# Patient Record
Sex: MALE | Race: WHITE | Employment: OTHER | URBAN - METROPOLITAN AREA
[De-identification: names, ages, dates, MRNs, and addresses within clinical notes are randomized per-mention and may not be internally consistent; named-entity substitution may affect disease eponyms.]

---

## 2017-09-12 PROBLEM — H26.9 LEFT CATARACT: Status: ACTIVE | Noted: 2017-09-12

## 2021-01-21 ENCOUNTER — HOSPITAL ENCOUNTER (OUTPATIENT)
Age: 81
Discharge: HOME | End: 2021-01-23
Payer: MEDICARE

## 2021-01-21 ENCOUNTER — HOSPITAL ENCOUNTER (OUTPATIENT)
Dept: GENERAL RADIOLOGY | Age: 81
Discharge: HOME | End: 2021-01-23
Payer: MEDICARE

## 2021-01-21 DIAGNOSIS — R06.02 SHORTNESS OF BREATH: ICD-10-CM

## 2021-01-21 PROCEDURE — 71046 X-RAY EXAM CHEST 2 VIEWS: CPT

## 2021-05-24 ENCOUNTER — APPOINTMENT (OUTPATIENT)
Dept: GENERAL RADIOLOGY | Age: 81
DRG: 481 | End: 2021-05-24
Payer: MEDICARE

## 2021-05-24 ENCOUNTER — ANESTHESIA EVENT (OUTPATIENT)
Dept: OPERATING ROOM | Age: 81
DRG: 481 | End: 2021-05-24
Payer: MEDICARE

## 2021-05-24 ENCOUNTER — HOSPITAL ENCOUNTER (INPATIENT)
Age: 81
LOS: 4 days | Discharge: NURSING, REHAB OR LONG-TERM CARE | DRG: 481 | End: 2021-05-28
Attending: EMERGENCY MEDICINE | Admitting: INTERNAL MEDICINE
Payer: MEDICARE

## 2021-05-24 DIAGNOSIS — G89.18 POST-OP PAIN: ICD-10-CM

## 2021-05-24 DIAGNOSIS — S72.001A CLOSED FRACTURE OF RIGHT HIP, INITIAL ENCOUNTER (HCC): Primary | ICD-10-CM
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
## 2021-05-24 DIAGNOSIS — I48.92 ATRIAL FLUTTER, UNSPECIFIED TYPE (HCC): ICD-10-CM

## 2021-05-24 DIAGNOSIS — R53.1 GENERAL WEAKNESS: ICD-10-CM

## 2021-05-24 DIAGNOSIS — I50.43 ACUTE ON CHRONIC COMBINED SYSTOLIC AND DIASTOLIC CHF (CONGESTIVE HEART FAILURE) (HCC): ICD-10-CM

## 2021-05-24 PROBLEM — S72.021A: Status: ACTIVE | Noted: 2021-05-24

## 2021-05-24 LAB
ANION GAP SERPL CALCULATED.3IONS-SCNC: 8 MMOL/L (ref 7–16)
ANISOCYTOSIS: ABNORMAL
BACTERIA: ABNORMAL /HPF
BASOPHILS ABSOLUTE: 0.07 E9/L (ref 0–0.2)
BASOPHILS RELATIVE PERCENT: 1.7 % (ref 0–2)
BILIRUBIN URINE: NEGATIVE
BLOOD, URINE: ABNORMAL
BUN BLDV-MCNC: 19 MG/DL (ref 6–23)
BURR CELLS: ABNORMAL
CALCIUM SERPL-MCNC: 8.8 MG/DL (ref 8.6–10.2)
CHLORIDE BLD-SCNC: 106 MMOL/L (ref 98–107)
CLARITY: CLEAR
CO2: 25 MMOL/L (ref 22–29)
COLOR: YELLOW
CREAT SERPL-MCNC: 1.1 MG/DL (ref 0.7–1.2)
EKG ATRIAL RATE: 264 BPM
EKG P AXIS: 34 DEGREES
EKG Q-T INTERVAL: 538 MS
EKG QRS DURATION: 140 MS
EKG QTC CALCULATION (BAZETT): 449 MS
EKG R AXIS: -72 DEGREES
EKG T AXIS: -94 DEGREES
EKG VENTRICULAR RATE: 42 BPM
EOSINOPHILS ABSOLUTE: 0.1 E9/L (ref 0.05–0.5)
EOSINOPHILS RELATIVE PERCENT: 2.6 % (ref 0–6)
GFR AFRICAN AMERICAN: >60
GFR NON-AFRICAN AMERICAN: >60 ML/MIN/1.73
GLUCOSE BLD-MCNC: 142 MG/DL (ref 74–99)
GLUCOSE URINE: NEGATIVE MG/DL
HCT VFR BLD CALC: 36.5 % (ref 37–54)
HEMOGLOBIN: 11.9 G/DL (ref 12.5–16.5)
KETONES, URINE: ABNORMAL MG/DL
LEUKOCYTE ESTERASE, URINE: NEGATIVE
LYMPHOCYTES ABSOLUTE: 0.56 E9/L (ref 1.5–4)
LYMPHOCYTES RELATIVE PERCENT: 13.9 % (ref 20–42)
MCH RBC QN AUTO: 34.3 PG (ref 26–35)
MCHC RBC AUTO-ENTMCNC: 32.6 % (ref 32–34.5)
MCV RBC AUTO: 105.2 FL (ref 80–99.9)
MONOCYTES ABSOLUTE: 0.2 E9/L (ref 0.1–0.95)
MONOCYTES RELATIVE PERCENT: 5.2 % (ref 2–12)
NEUTROPHILS ABSOLUTE: 3.08 E9/L (ref 1.8–7.3)
NEUTROPHILS RELATIVE PERCENT: 76.5 % (ref 43–80)
NITRITE, URINE: NEGATIVE
OVALOCYTES: ABNORMAL
PDW BLD-RTO: 13.6 FL (ref 11.5–15)
PH UA: 6 (ref 5–9)
PLATELET # BLD: 84 E9/L (ref 130–450)
PLATELET CONFIRMATION: NORMAL
PMV BLD AUTO: 12.2 FL (ref 7–12)
POIKILOCYTES: ABNORMAL
POTASSIUM REFLEX MAGNESIUM: 4.4 MMOL/L (ref 3.5–5)
PRO-BNP: 2525 PG/ML (ref 0–450)
PROTEIN UA: NEGATIVE MG/DL
RBC # BLD: 3.47 E12/L (ref 3.8–5.8)
RBC UA: ABNORMAL /HPF (ref 0–2)
SARS-COV-2, NAAT: NOT DETECTED
SODIUM BLD-SCNC: 139 MMOL/L (ref 132–146)
SPECIFIC GRAVITY UA: 1.01 (ref 1–1.03)
T4 FREE: 1.65 NG/DL (ref 0.93–1.7)
TROPONIN: 0.03 NG/ML (ref 0–0.03)
TSH SERPL DL<=0.05 MIU/L-ACNC: 2.72 UIU/ML (ref 0.27–4.2)
UROBILINOGEN, URINE: 1 E.U./DL
WBC # BLD: 4 E9/L (ref 4.5–11.5)
WBC UA: ABNORMAL /HPF (ref 0–5)

## 2021-05-24 PROCEDURE — 71045 X-RAY EXAM CHEST 1 VIEW: CPT

## 2021-05-24 PROCEDURE — 85025 COMPLETE CBC W/AUTO DIFF WBC: CPT

## 2021-05-24 PROCEDURE — 81001 URINALYSIS AUTO W/SCOPE: CPT

## 2021-05-24 PROCEDURE — 96374 THER/PROPH/DIAG INJ IV PUSH: CPT

## 2021-05-24 PROCEDURE — 73552 X-RAY EXAM OF FEMUR 2/>: CPT

## 2021-05-24 PROCEDURE — 96375 TX/PRO/DX INJ NEW DRUG ADDON: CPT

## 2021-05-24 PROCEDURE — 84443 ASSAY THYROID STIM HORMONE: CPT

## 2021-05-24 PROCEDURE — 87635 SARS-COV-2 COVID-19 AMP PRB: CPT

## 2021-05-24 PROCEDURE — 80048 BASIC METABOLIC PNL TOTAL CA: CPT

## 2021-05-24 PROCEDURE — 83880 ASSAY OF NATRIURETIC PEPTIDE: CPT

## 2021-05-24 PROCEDURE — 93005 ELECTROCARDIOGRAM TRACING: CPT | Performed by: STUDENT IN AN ORGANIZED HEALTH CARE EDUCATION/TRAINING PROGRAM

## 2021-05-24 PROCEDURE — 2060000000 HC ICU INTERMEDIATE R&B

## 2021-05-24 PROCEDURE — 99284 EMERGENCY DEPT VISIT MOD MDM: CPT

## 2021-05-24 PROCEDURE — 99222 1ST HOSP IP/OBS MODERATE 55: CPT | Performed by: ORTHOPAEDIC SURGERY

## 2021-05-24 PROCEDURE — 6360000002 HC RX W HCPCS: Performed by: STUDENT IN AN ORGANIZED HEALTH CARE EDUCATION/TRAINING PROGRAM

## 2021-05-24 PROCEDURE — 84484 ASSAY OF TROPONIN QUANT: CPT

## 2021-05-24 PROCEDURE — 99223 1ST HOSP IP/OBS HIGH 75: CPT | Performed by: INTERNAL MEDICINE

## 2021-05-24 PROCEDURE — 2580000003 HC RX 258: Performed by: INTERNAL MEDICINE

## 2021-05-24 PROCEDURE — 73521 X-RAY EXAM HIPS BI 2 VIEWS: CPT

## 2021-05-24 PROCEDURE — 84439 ASSAY OF FREE THYROXINE: CPT

## 2021-05-24 PROCEDURE — 6370000000 HC RX 637 (ALT 250 FOR IP): Performed by: INTERNAL MEDICINE

## 2021-05-24 PROCEDURE — 93010 ELECTROCARDIOGRAM REPORT: CPT | Performed by: INTERNAL MEDICINE

## 2021-05-24 RX ORDER — ACETAMINOPHEN 650 MG/1
650 SUPPOSITORY RECTAL EVERY 6 HOURS PRN
Status: DISCONTINUED | OUTPATIENT
Start: 2021-05-24 | End: 2021-05-28 | Stop reason: HOSPADM

## 2021-05-24 RX ORDER — FUROSEMIDE 20 MG/1
20 TABLET ORAL DAILY
Status: DISCONTINUED | OUTPATIENT
Start: 2021-05-25 | End: 2021-05-28 | Stop reason: HOSPADM

## 2021-05-24 RX ORDER — MORPHINE SULFATE 4 MG/ML
4 INJECTION, SOLUTION INTRAMUSCULAR; INTRAVENOUS ONCE
Status: COMPLETED | OUTPATIENT
Start: 2021-05-24 | End: 2021-05-24

## 2021-05-24 RX ORDER — FOLIC ACID 1 MG/1
1 TABLET ORAL
Status: DISCONTINUED | OUTPATIENT
Start: 2021-05-24 | End: 2021-05-28 | Stop reason: HOSPADM

## 2021-05-24 RX ORDER — OXYCODONE HYDROCHLORIDE AND ACETAMINOPHEN 5; 325 MG/1; MG/1
1 TABLET ORAL EVERY 4 HOURS PRN
Status: DISCONTINUED | OUTPATIENT
Start: 2021-05-24 | End: 2021-05-28 | Stop reason: HOSPADM

## 2021-05-24 RX ORDER — FERROUS SULFATE 325(65) MG
325 TABLET ORAL
COMMUNITY

## 2021-05-24 RX ORDER — OXYCODONE HYDROCHLORIDE AND ACETAMINOPHEN 5; 325 MG/1; MG/1
2 TABLET ORAL EVERY 4 HOURS PRN
Status: DISCONTINUED | OUTPATIENT
Start: 2021-05-24 | End: 2021-05-28 | Stop reason: HOSPADM

## 2021-05-24 RX ORDER — POLYETHYLENE GLYCOL 3350 17 G/17G
17 POWDER, FOR SOLUTION ORAL DAILY PRN
Status: DISCONTINUED | OUTPATIENT
Start: 2021-05-24 | End: 2021-05-28 | Stop reason: HOSPADM

## 2021-05-24 RX ORDER — SODIUM CHLORIDE 0.9 % (FLUSH) 0.9 %
10 SYRINGE (ML) INJECTION PRN
Status: DISCONTINUED | OUTPATIENT
Start: 2021-05-24 | End: 2021-05-28 | Stop reason: HOSPADM

## 2021-05-24 RX ORDER — ASPIRIN 81 MG/1
81 TABLET ORAL DAILY
Status: DISCONTINUED | OUTPATIENT
Start: 2021-05-26 | End: 2021-05-27

## 2021-05-24 RX ORDER — PROMETHAZINE HYDROCHLORIDE 25 MG/1
12.5 TABLET ORAL EVERY 6 HOURS PRN
Status: DISCONTINUED | OUTPATIENT
Start: 2021-05-24 | End: 2021-05-28 | Stop reason: HOSPADM

## 2021-05-24 RX ORDER — SODIUM CHLORIDE 0.9 % (FLUSH) 0.9 %
5-40 SYRINGE (ML) INJECTION EVERY 12 HOURS SCHEDULED
Status: DISCONTINUED | OUTPATIENT
Start: 2021-05-24 | End: 2021-05-28 | Stop reason: HOSPADM

## 2021-05-24 RX ORDER — LISINOPRIL 5 MG/1
2.5 TABLET ORAL DAILY
Status: DISCONTINUED | OUTPATIENT
Start: 2021-05-24 | End: 2021-05-28 | Stop reason: HOSPADM

## 2021-05-24 RX ORDER — FUROSEMIDE 10 MG/ML
40 INJECTION INTRAMUSCULAR; INTRAVENOUS ONCE
Status: COMPLETED | OUTPATIENT
Start: 2021-05-24 | End: 2021-05-24

## 2021-05-24 RX ORDER — ACETAMINOPHEN 325 MG/1
650 TABLET ORAL EVERY 6 HOURS PRN
Status: DISCONTINUED | OUTPATIENT
Start: 2021-05-24 | End: 2021-05-28 | Stop reason: HOSPADM

## 2021-05-24 RX ORDER — MORPHINE SULFATE 2 MG/ML
2 INJECTION, SOLUTION INTRAMUSCULAR; INTRAVENOUS EVERY 4 HOURS PRN
Status: DISCONTINUED | OUTPATIENT
Start: 2021-05-24 | End: 2021-05-28 | Stop reason: HOSPADM

## 2021-05-24 RX ORDER — LIDOCAINE HYDROCHLORIDE ANHYDROUS AND DEXTROSE MONOHYDRATE .4; 5 G/100ML; G/100ML
1 INJECTION, SOLUTION INTRAVENOUS CONTINUOUS
Status: DISCONTINUED | OUTPATIENT
Start: 2021-05-24 | End: 2021-05-24

## 2021-05-24 RX ORDER — SODIUM CHLORIDE 9 MG/ML
25 INJECTION, SOLUTION INTRAVENOUS PRN
Status: DISCONTINUED | OUTPATIENT
Start: 2021-05-24 | End: 2021-05-28 | Stop reason: HOSPADM

## 2021-05-24 RX ORDER — CARVEDILOL 6.25 MG/1
12.5 TABLET ORAL 2 TIMES DAILY WITH MEALS
Status: DISCONTINUED | OUTPATIENT
Start: 2021-05-24 | End: 2021-05-24

## 2021-05-24 RX ORDER — ATORVASTATIN CALCIUM 10 MG/1
10 TABLET, FILM COATED ORAL DAILY
Status: DISCONTINUED | OUTPATIENT
Start: 2021-05-25 | End: 2021-05-28 | Stop reason: HOSPADM

## 2021-05-24 RX ORDER — ONDANSETRON 2 MG/ML
4 INJECTION INTRAMUSCULAR; INTRAVENOUS EVERY 6 HOURS PRN
Status: DISCONTINUED | OUTPATIENT
Start: 2021-05-24 | End: 2021-05-28 | Stop reason: HOSPADM

## 2021-05-24 RX ORDER — POTASSIUM CHLORIDE 20 MEQ/1
20 TABLET, EXTENDED RELEASE ORAL
COMMUNITY

## 2021-05-24 RX ORDER — FERROUS SULFATE 325(65) MG
325 TABLET ORAL
Status: DISCONTINUED | OUTPATIENT
Start: 2021-05-24 | End: 2021-05-28 | Stop reason: HOSPADM

## 2021-05-24 RX ORDER — LEVOTHYROXINE SODIUM 112 UG/1
112 TABLET ORAL NIGHTLY
Status: DISCONTINUED | OUTPATIENT
Start: 2021-05-24 | End: 2021-05-28 | Stop reason: HOSPADM

## 2021-05-24 RX ADMIN — FOLIC ACID 1 MG: 1 TABLET ORAL at 20:30

## 2021-05-24 RX ADMIN — OXYCODONE AND ACETAMINOPHEN 1 TABLET: 5; 325 TABLET ORAL at 20:30

## 2021-05-24 RX ADMIN — LEVOTHYROXINE SODIUM 112 MCG: 0.11 TABLET ORAL at 21:42

## 2021-05-24 RX ADMIN — MORPHINE SULFATE 4 MG: 4 INJECTION, SOLUTION INTRAMUSCULAR; INTRAVENOUS at 12:43

## 2021-05-24 RX ADMIN — Medication 10 ML: at 20:30

## 2021-05-24 RX ADMIN — LISINOPRIL 2.5 MG: 5 TABLET ORAL at 21:44

## 2021-05-24 RX ADMIN — FERROUS SULFATE TAB 325 MG (65 MG ELEMENTAL FE) 325 MG: 325 (65 FE) TAB at 20:30

## 2021-05-24 RX ADMIN — FUROSEMIDE 40 MG: 10 INJECTION, SOLUTION INTRAMUSCULAR; INTRAVENOUS at 12:48

## 2021-05-24 ASSESSMENT — PAIN SCALES - GENERAL
PAINLEVEL_OUTOF10: 0
PAINLEVEL_OUTOF10: 8

## 2021-05-24 NOTE — CARE COORDINATION
Social Work /Discharge Planning:    Pt presents to the ED secondary to a fall at home resulting in right hip pain. SW met with pt who was alert and oriented x3, but had difficulty hearing. Pt's son, Andrade Corona, was also in the room. Pt and wife live in a one floor home. Pt has a walker and wheel chair at home but reports the wheel chair is too big for his home. Pt reports no other DME. Pt has hx at [facility] several years ago but was unable to remember name of facility. Pt's PCP is Dr Kuldip Gamez and he uses [facility] in [place] to fill his prescriptions. KAPIL briefly discussed [facility] placement with pt and son. Pt is in agreement and stated he would have to call his wife to get the name of the facility he was at in the past.      KAPIL/ANDERSON to follow up with pt for BA[facility]ILIO options and/or final discharge plan.

## 2021-05-24 NOTE — H&P
[address] [facility]  History and Physical      CHIEF COMPLAINT:  Fall       Patient of Jessica Garduno MD presents with:  Closed fracture proximal femur, transepiphyseal, right, initial encounter [facility])    History of Present Illness:   Patient is a 79year old male with a past medical history of  DM, thyroid disease, HTN, and CAD. Patient presents to the ED today with complaints of falling at home this am.  Patient states he was using his walker in the kitchen and he became weak and fell to the floor. He states this happened about a week ago, however he was able to get up on his own. Today he was unable to get up therefore he called for his wife to help him. When she couldn't get him up EMS was called. Patient complains of pain in his right hip. Patient denies hitting his head, or any other injuries. Patient denies any chest pain, shortness of breath, fever, chills, abdominal pain, dizziness, fatigue, and headache. Patient's right lower extremity is externally rotated and he complains of pain. X-ray reveals right femur fracture. Orthopedic surgery has been consulted. REVIEW OF SYSTEMS:  Pertinent negatives are above in HPI. 10 point ROS otherwise negative. Past Medical History:   Diagnosis Date    Diabetes mellitus ([address]     History of blood transfusion     2014    Hypertension     Thyroid disease          Past Surgical History:   Procedure Laterality Date    CARDIAC VALVE REPLACEMENT  2014    CATARACT REMOVAL WITH IMPLANT Left 09/12/2017    CHOLECYSTECTOMY      OTHER SURGICAL HISTORY  1997    quadruple bypass        Medications Prior to Admission:    Not in a hospital admission. Note that the patient's home medications were reviewed and the above list is accurate to the best of my knowledge at the time of the exam.    Allergies:    Patient has no known allergies. Social History:    reports that he quit smoking about 24 years ago.  He has never used smokeless tobacco.    Family History:   Unknown at this time      PHYSICAL EXAM:    Vitals:  BP (!) 130/41   Pulse (!) 41   Temp 97.5 °F (36.4 °C) (Temporal)   Resp 16   SpO2 95%       General appearance: NAD, conversant, ill appearing  Eyes: Sclerae anicteric, PERRLA  HEENT: AT/NC, MMM  Neck: FROM, supple, no thyromegaly  Lymph: No cervical / supraclavicular lymphadenopathy  Lungs: Clear to auscultation, WOB normal  CV: RRR, no MRGs, no lower extremity edema  Abdomen: Soft, non-tender; no masses or HSM, +BS  Extremities: FROM without synovitis. No clubbing or cyanosis of the hands. Right leg shortened and externally rotated. Skin: no rash, induration, lesions, or ulcers  Psych: Calm and cooperative. Normal judgement and insight. Normal mood and affect. Neuro: Alert and interactive, face symmetric, speech fluent. 5/5 strength upper extremities, and 5/5 left lower extremity. Patient has ability to move toes in right lower extremity. LABS:  All labs reviewed. Of note:  CBC:   Lab Results   Component Value Date    WBC 4.0 05/24/2021    RBC 3.47 05/24/2021    HGB 11.9 05/24/2021    HCT 36.5 05/24/2021    .2 05/24/2021    MCH 34.3 05/24/2021    MCHC 32.6 05/24/2021    RDW 13.6 05/24/2021    PLT 84 05/24/2021    MPV 12.2 05/24/2021     CMP:    Lab Results   Component Value Date     05/24/2021    K 4.4 05/24/2021     05/24/2021    CO2 25 05/24/2021    BUN 19 05/24/2021    CREATININE 1.1 05/24/2021    GFRAA >60 05/24/2021    LABGLOM >60 05/24/2021    GLUCOSE 142 05/24/2021    CALCIUM 8.8 05/24/2021       Imaging:  CXR:  WNL    X-ray Bilateral Hip:  Mildly displaced right femoral fracture. XR right femur:  Comminuted intratrochanteric fracture of the right hip.       EKG:  I've personally reviewed the patient's EKG:  Atrial flutter with variable AV block  Right bundle branch block  Left anterior fascicular block    Telemetry:  I've personally reviewed the patient's

## 2021-05-24 NOTE — ED NOTES
Bed: 14B-14  Expected date: 5/24/21  Expected time:   Means of arrival: [facility]  [facility]  05/24/21 2538

## 2021-05-24 NOTE — ED PROVIDER NOTES
ATTENDING PROVIDER ATTESTATION:     Adalberto Slaughter presented to the emergency department for evaluation of Fall (right leg possible rotation)   and was initially evaluated by the Medical Resident. See Original ED Note for H&P and ED course above. I have reviewed and discussed the case, including pertinent history (medical, surgical, family and social) and exam findings with the Medical Resident assigned to Adalberto Slaughter. I have personally performed and/or participated in the history, exam, medical decision making, and procedures and agree with all pertinent clinical information and any additional changes or corrections are noted below in my assessment and plan. I have discussed this patient in detail with the resident, and provided the instruction and education,       I have reviewed my findings and recommendations with the assigned Medical Resident, Adalberto Slaughter and members of family present at the time of disposition. I have performed a history and physical examination of this patient and directly supervised the resident caring for this patient      History of Present Illness:    Presents to the ED for fall, beginning prior to arrival.  The complaint has been constant, severe in severity, and worsened by nothing. Fall, says his legs got wobbly and he fell. Has right leg deformity on arrival. Denies chest pain or shortness of breath. No syncope. Review of Systems:   A complete review of systems was performed and pertinent positives and negatives are stated within HPI, all other systems reviewed and are negative.    --------------------------------------------- PAST HISTORY ---------------------------------------------  Past Medical History:  has a past medical history of Diabetes mellitus ([facility] [address] History of blood transfusion, Hypertension, and Thyroid disease. Past Surgical History:  has a past surgical history that includes Cholecystectomy; other surgical history (1997);  Cardiac valve replacement (2014); and Cataract removal with implant (Left, 09/12/2017). Social History:  reports that he quit smoking about 24 years ago. He has never used smokeless tobacco.    Family History: family history is not on file. Unless otherwise noted, family history is non contributory    The patients home medications have been reviewed. Allergies: Patient has no known allergies. Physical Exam:  Constitutional/General: Alert and oriented x3  Head: Normocephalic and atraumatic  Eyes: PERRL, EOMI, sclera non icteric  ENT: Oropharynx clear, handling secretions  Neck: Supple, full ROM, no stridor, no meningeal signs  Respiratory: Lungs clear to auscultation bilaterally, no wheezes, rales, or rhonchi. Not in respiratory distress  Cardiovascular:  Irregular, bradycardic, 2+ distal pulses. Equal extremity pulses. GI:  Abdomen Soft, Non tender, Non distended. No rebound, guarding, or rigidity. No pulsatile masses. Musculoskeletal: Moves all extremities x 4 unable to lift the right leg secondary to pain. The right leg is shortened and externally rotated. Wiggles toes. Palpable pulses. Neurologically intact. Warm and well perfused,  no clubbing, no cyanosis, no edema. Palpable peripheral pulses  Integument: skin warm and dry. No rashes. Neurologic: GCS 15, no focal deficits  Psychiatric: Normal Affect      I directly supervised any procedures performed by the resident and was present for the procedure including all critical portions of the procedure      The cardiac monitor revealed atrial flutter,with a heart rate in the 40s as interpreted by me. The cardiac monitor was ordered secondary to the patient's fall and to monitor the patient for dysrhythmia. CPT [id number]      I, Dr. Eevlia Drummond, am the primary provider of record    My Medical Decision Making:       Fall  Hip fracture, closed neurovascularly intact  Also with flutter, juan jose, ?from coreg  Ortho consult  Medicine consulted for admission        1. Closed fracture of right hip, initial encounter ([facility] [address]    2. Fall, initial encounter    3. General weakness    4. Acute on chronic combined systolic and diastolic CHF (congestive heart failure) ([facility] [address]    5.  Atrial flutter, unspecified type [facility])           Meghan Yancey MD  05/24/21 1269

## 2021-05-24 NOTE — CONSULTS
Department of Orthopedic Surgery  Resident Consult Note  Reason for Consult:  R hip pain    HISTORY OF PRESENT ILLNESS:       Patient is a 79 y.o. male with hip pain after mechanical fall from standing height earlier today. Patient denies head trauma or LOC. He reports he was try to get off the couch when he lost his footing and fell landing on his right hip. He does report a history of recent falls but was able to ambulate after each fall until the fall today. Patient is a community ambulator periodically uses a cane and wheeled walker for ambulation. Denies numbness/tingling/paresthesias. Denies any other orthopedic complaints at this time. Past Medical History:        Diagnosis Date    Diabetes mellitus ([facility] [address]     History of blood transfusion     2014    Hypertension     Thyroid disease      Past Surgical History:        Procedure Laterality Date    CARDIAC VALVE REPLACEMENT  2014    CATARACT REMOVAL WITH IMPLANT Left 09/12/2017    CHOLECYSTECTOMY      OTHER SURGICAL HISTORY  1997    quadruple bypass      Current Medications:   No current facility-administered medications for this encounter. Allergies:  Patient has no known allergies. Social History:   TOBACCO:   reports that he quit smoking about 24 years ago. He has never used smokeless tobacco.  ETOH:   has no history on file for alcohol use. DRUGS:   has no history on file for drug use. ACTIVITIES OF DAILY LIVING:    OCCUPATION:    Family History:   History reviewed. No pertinent family history.     REVIEW OF SYSTEMS:  CONSTITUTIONAL:  negative for fevers, chills  EYES:  negative for acute changes  HEENT:  negative for acute changes  RESPIRATORY:  negative for dyspnea  CARDIOVASCULAR:  negative for chest pain, palpitations  GASTROINTESTINAL:  negative for nausea, vomiting  GENITOURINARY:  negative for frequency  HEMATOLOGIC/LYMPHATIC:  negative for bleeding and petechiae  MUSCULOSKELETAL:  positive for right hip pain  NEUROLOGICAL: negative for head trauma or LOC  BEHAVIOR/PSYCH:  negative for increased agitation and anxiety    PHYSICAL EXAM:    VITALS:  BP (!) 130/41   Pulse (!) 41   Temp 97.5 °F (36.4 °C) (Temporal)   Resp 16   SpO2 95%   CONSTITUTIONAL:  awake, alert, cooperative, no apparent distress, and appears stated age  MUSCULOSKELETAL:  Right lower Extremity:  Extremity fixed in external rotation as this is a position of comfort  Compartments soft and compressible  +PF/DF/EHL  +2/4 DP & PT pulses, Brisk Cap refill, Toes warm and perfused  Distal sensation grossly intact to Peroneals, Sural, Saphenous, and tibial nrs    Secondary Exam:   · Bilateral UE: No obvious signs of trauma. -TTP to fingers, hand, wrist, forearm, elbow, humerus, shoulder or clavicle. -- Patient able to flex/extend fingers, wrist, elbow and shoulder with active and passive ROM without pain, +2/4 Radial pulse, cap refill <3sec, +AIN/PIN/Radial/Ulnar/Median N, distal sensation grossly intact to C4-T1 dermatomes, compartments soft and compressible. · Left LE: No obvious signs of trauma. -TTP to foot, ankle, leg, knee, thigh, hip.-- Patient able to flex/extend toes, ankle, knee and hip with active and passive ROM without pain,+2/4 DP & PT pulses, cap refill <3sec, +5/5 PF/DF/EHL, distal sensation grossly intact to L4-S1 dermatomes, compartments soft and compressible. · Pelvis: -TTP, -Log roll, -Heel strike     DATA:    CBC:   Lab Results   Component Value Date    WBC 4.0 05/24/2021    RBC 3.47 05/24/2021    HGB 11.9 05/24/2021    HCT 36.5 05/24/2021    .2 05/24/2021    MCH 34.3 05/24/2021    MCHC 32.6 05/24/2021    RDW 13.6 05/24/2021    PLT 84 05/24/2021    MPV 12.2 05/24/2021     PT/INR:  No results found for: PROTIME, INR    Radiology Review:  X-ray bilateral hip/right femur: Comminuted intertrochanteric fracture with complete displacement and varus angulation. No other fractures or dislocations appreciated.     IMPRESSION:  · Right intertrochanteric femur fracture    PLAN:  · We will plan for acute orthopedic surgical intervention, right femur open reduction internal fixation with Dr. Sascha Hawkins 5/25/2021  · Nonweightbearing right lower extremity  · N.p.o. after midnight  · Treatment consent  · Hold anticoagulants   · Medical clearance  · Preoperative labs/imaging per medicine  · Plan was discussed with attending    All questions were sought and answered during encounter    Electronically signed by Ruchi Valdes DO on 5/24/2021 at 1:29 PM       I have seen and evaluated the patient and agree with the above assessment on today's visit. I have performed the key components of the history and physical examination and concur completely with the findings and plans as documented. Agree with ROS, examination, FMH, PMH, PSH, SocHx, and allergies as above. Patient physically seen and examined. Patient status post fall with a right intertrochanteric hip fracture. Some comminution present. Talked in detail about surgical intervention. Talked about a cephalomedullary nail. Explained the risk of potential nail cut out requiring total hip arthroplasty. I explained the surgery in detail. I explained the risks and complications of surgery with the patient including but not limited to death from anesthesia, possible neurovascular damage, possible infection,  Possible wound healing issues, possible perioperative fracture, leg length discrepancy, implant failure, possible need for further surgery, etc.  Patient understood this, asked appropriate questions and decided to go forward with the procedure. Physical Examination:   General appearance: alert, well appearing, and in no distress,  normal appearing weight.  No visible signs of trauma   Mental status: alert, oriented to person, place, and time, normal mood, behavior, speech, dress, motor activity, and thought processes  Abdomen: soft, nondistended  Resp:   resp easy and unlabored, no

## 2021-05-24 NOTE — CONSULTS
tobacco: Never Used   Substance Use Topics    Alcohol use: Not on file       No current facility-administered medications for this encounter. Current Outpatient Medications   Medication Sig Dispense Refill    potassium chloride (KLOR-CON M) 20 MEQ extended release tablet Take 20 mEq by mouth Daily with lunch      ferrous sulfate (IRON 325) 325 (65 Fe) MG tablet Take 325 mg by mouth Daily with lunch      levothyroxine (SYNTHROID) 112 MCG tablet Take 112 mcg by mouth nightly       aspirin 81 MG tablet Take 81 mg by mouth daily      glipiZIDE (GLUCOTROL) 10 MG tablet Take 10 mg by mouth 2 times daily (before meals)      furosemide (LASIX) 20 MG tablet Take 20 mg by mouth daily       simvastatin (ZOCOR) 40 MG tablet Take 40 mg by mouth daily       quinapril (ACCUPRIL) 5 MG tablet Take 5 mg by mouth nightly      folic acid (FOLVITE) 1 MG tablet Take 1 mg by mouth Daily with lunch           No Known Allergies    ROS:   Constitutional: Negative for fever, + for activity change and appetite change. HENT: Negative for epistaxis. Eyes: Negative for diploplia, blurred vision. Respiratory: Negative for cough, chest tightness,  and wheezing. + for SOB  Cardiovascular: pertinent positives in HPI  Gastrointestinal: Negative for abdominal pain and blood in stool. All other review of systems are negative     PHYSICAL EXAM:   Vitals:    05/24/21 0950 05/24/21 1515   BP: (!) 130/41 136/63   Pulse: (!) 41 58   Resp: 16 16   Temp: 97.5 °F (36.4 °C)    TempSrc: Temporal    SpO2: 95% 96%      Constitutional: Well-developed, no acute distress, pale seen in the ER with family at bedside. Eyes: conjunctivae normal, no xanthelasma   Ears, Nose, Throat: oral mucosa moist, no cyanosis   CV: no JVD. Shirlie Moder. Normal S1S2 and no S3. No murmurs, rubs, or gallops.  PMI is nondisplaced  Lungs: clear to auscultation bilaterally, normal respiratory effort without used of accessory muscles  Abdomen: soft, non-tender,

## 2021-05-24 NOTE — ED PROVIDER NOTES
Department of Emergency Medicine   ED  Provider Note  Admit Date/RoomTime: 5/24/2021  9:46 AM  ED Room: 4509/4509-B          History of Present Illness:  5/24/21, Time: 9:58 AM EDT  Chief Complaint   Patient presents with    Fall     right leg possible rotation        Andrew Gay is a 79 y.o. male presenting to the ED for fall, beginning just prior to arrival.  The complaint has been persistent, moderate in severity, and worsened by nothing. The patient is an 79-year-old male with a history of diabetes, hypothyroidism, hypertension who presents to the emergency department via EMS from home for a fall. His symptoms were sudden in onset, just prior to arrival, nothing makes it better or worse. He was able to call for help right away, and he states he did not lay on the floor for a long period of time. Patient states that he was feeling weak when he was walking from the family room and his legs gave out and he fell landing on his right side. The patient states that he typically does walk with a walker and he just felt weak all over causing him to fall. He states that he landed on a bag of trash. His right leg is externally rotated and shortened per EMS. The patient denies any loss of consciousness, hitting his head, blood thinner use, chest pain, shortness of breath, palpitations, abdominal pain, nausea, vomiting, diarrhea, hematemesis, hematochezia, melena, back pain, neck pain, other injuries, recent hospitalization, recent illness, or other acute symptoms or concerns. Review of Systems:   Pertinent positives and negatives are stated within HPI, all other systems reviewed and are negative.        --------------------------------------------- PAST HISTORY ---------------------------------------------  Past Medical History:  has a past medical history of Diabetes mellitus ([facility] [address] History of blood transfusion, Hypertension, and Thyroid disease.     Past Surgical History:  has a past surgical history that includes Cholecystectomy; other surgical history (1997); Cardiac valve replacement (2014); and Cataract removal with implant (Left, 09/12/2017). Social History:  reports that he quit smoking about 24 years ago. He has never used smokeless tobacco.    Family History: family history is not on file. . Unless otherwise noted, family history is non contributory    The patients home medications have been reviewed. Allergies: Patient has no known allergies. I have reviewed the past medical history, past surgical history, social history, and family history    ---------------------------------------------------PHYSICAL EXAM--------------------------------------    Constitutional/General: Alert and oriented x3  Head: Normocephalic and atraumatic  Eyes:  EOMI, sclera non icteric  Mouth: Oropharynx clear, handling secretions, no trismus, no asymmetry of the posterior oropharynx or uvular edema  Neck: Supple, full ROM, no stridor, no meningeal signs  Respiratory: Lungs clear to auscultation bilaterally, no wheezes, rales, or rhonchi. Not in respiratory distress  Cardiovascular: Bradycardic rate. Regular rhythm. No murmurs, no aortic murmurs, no gallops, or rubs. 2+ distal pulses. Equal extremity pulses. Chest: No chest wall tenderness  Gastrointestinal:  Abdomen Soft, Non tender, Non distended. No rebound, guarding, or rigidity. No pulsatile masses. Musculoskeletal:  Warm and well perfused, no clubbing, no cyanosis, no edema. Capillary refill <3 seconds. Right leg is externally rotated and shortened. He is able to move his toes. Equal strength in the bilateral upper extremities. Sensation is intact and equal of the bilateral upper and lower extremities. There is mild tenderness palpation over the right hip and upper femur. No step offs or deformities of the cervical spine, non-tender to palpation. Skin: skin warm and dry. No rashes.    Neurologic: GCS 15, no focal deficits, symmetric strength 5/5 in the upper and lower extremities bilaterally  Psychiatric: Normal Affect          -------------------------------------------------- RESULTS -------------------------------------------------  I have personally reviewed all laboratory and imaging results for this patient. Results are listed below.      LABS: (Lab results interpreted by me)  Results for orders placed or performed during the hospital encounter of 05/24/21   COVID-19, Rapid    Specimen: Nasopharyngeal Swab   Result Value Ref Range    SARS-CoV-2, NAAT Not Detected Not Detected   CBC auto differential   Result Value Ref Range    WBC 4.0 (L) 4.5 - 11.5 E9/L    RBC 3.47 (L) 3.80 - 5.80 E12/L    Hemoglobin 11.9 (L) 12.5 - 16.5 g/dL    Hematocrit 36.5 (L) 37.0 - 54.0 %    .2 (H) 80.0 - 99.9 fL    MCH 34.3 26.0 - 35.0 pg    MCHC 32.6 32.0 - 34.5 %    RDW 13.6 11.5 - 15.0 fL    Platelets 84 (L) 778 - 450 E9/L    MPV 12.2 (H) 7.0 - 12.0 fL    Neutrophils % 76.5 43.0 - 80.0 %    Lymphocytes % 13.9 (L) 20.0 - 42.0 %    Monocytes % 5.2 2.0 - 12.0 %    Eosinophils % 2.6 0.0 - 6.0 %    Basophils % 1.7 0.0 - 2.0 %    Neutrophils Absolute 3.08 1.80 - 7.30 E9/L    Lymphocytes Absolute 0.56 (L) 1.50 - 4.00 E9/L    Monocytes Absolute 0.20 0.10 - 0.95 E9/L    Eosinophils Absolute 0.10 0.05 - 0.50 E9/L    Basophils Absolute 0.07 0.00 - 0.20 E9/L    Anisocytosis 1+     Poikilocytes 2+     Rickreall Cells 2+     Ovalocytes 1+    Basic Metabolic Panel w/ Reflex to MG   Result Value Ref Range    Sodium 139 132 - 146 mmol/L    Potassium reflex Magnesium 4.4 3.5 - 5.0 mmol/L    Chloride 106 98 - 107 mmol/L    CO2 25 22 - 29 mmol/L    Anion Gap 8 7 - 16 mmol/L    Glucose 142 (H) 74 - 99 mg/dL    BUN 19 6 - 23 mg/dL    CREATININE 1.1 0.7 - 1.2 mg/dL    GFR Non-African American >60 >=60 mL/min/1.73    GFR African American >60     Calcium 8.8 8.6 - 10.2 mg/dL   Troponin   Result Value Ref Range    Troponin 0.03 0.00 - 0.03 ng/mL   Brain Natriuretic Peptide   Result Value Ref Range Pro-BNP 2,525 (H) 0 - 450 pg/mL   Urinalysis with Microscopic   Result Value Ref Range    Color, UA Yellow Straw/Yellow    Clarity, UA Clear Clear    Glucose, Ur Negative Negative mg/dL    Bilirubin Urine Negative Negative    Ketones, Urine TRACE (A) Negative mg/dL    Specific Gravity, UA 1.015 1.005 - 1.030    Blood, Urine TRACE-INTACT Negative    pH, UA 6.0 5.0 - 9.0    Protein, UA Negative Negative mg/dL    Urobilinogen, Urine 1.0 <2.0 E.U./dL    Nitrite, Urine Negative Negative    Leukocyte Esterase, Urine Negative Negative    WBC, UA NONE 0 - 5 /HPF    RBC, UA 1-3 0 - 2 /HPF    Bacteria, UA NONE SEEN None Seen /HPF   TSH without Reflex   Result Value Ref Range    TSH 2.720 0.270 - 4.200 uIU/mL   T4, FREE   Result Value Ref Range    T4 Free 1.65 0.93 - 1.70 ng/dL   Platelet Confirmation   Result Value Ref Range    Platelet Confirmation CONFIRMED    EKG 12 Lead   Result Value Ref Range    Ventricular Rate 42 BPM    Atrial Rate 264 BPM    QRS Duration 140 ms    Q-T Interval 538 ms    QTc Calculation (Bazett) 449 ms    P Axis 34 degrees    R Axis -72 degrees    T Axis -94 degrees   ,       RADIOLOGY:  Interpreted by Radiologist unless otherwise specified  XR FEMUR RIGHT (MIN 2 VIEWS)   Final Result   1. Comminuted intratrochanteric fracture of the right hip   2. There is no fracture of the distal femur. XR HIP BILATERAL W AP PELVIS (2 VIEWS)   Final Result   Mildly displaced right femoral fracture. XR CHEST PORTABLE   Final Result   1. Cardiomegaly with findings of mild CHF. EKG Interpretation  Interpreted by Rebecca Muhammad DO    EKG: This EKG is signed and interpreted by me.     Rate: 42  Rhythm: Atrial flutter  Interpretation: Atrial flutter with variable AV block, left axis deviation, right bundle branch block, left anterior fascicular block, nonspecific ST changes in the anterior leads, no acute elevations, QTC is 449  Comparison: no previous EKG available ------------------------- NURSING NOTES AND VITALS REVIEWED ---------------------------   The nursing notes within the ED encounter and vital signs as below have been reviewed by myself  /62   Pulse 56   Temp 97.5 °F (36.4 °C) (Temporal) Comment: admitted from 5419  Resp 20   Ht 5' 9\" (1.753 m)   Wt 179 lb (81.2 kg)   SpO2 99%   BMI 26.43 kg/m²     Oxygen Saturation Interpretation: 95 % on room air. Normal    The patients available past medical records and past encounters were reviewed.         ------------------------------ ED COURSE/MEDICAL DECISION MAKING----------------------  Medications   aspirin EC tablet 81 mg (has no administration in time range)   ferrous sulfate (IRON 325) tablet 325 mg (has no administration in time range)   folic acid (FOLVITE) tablet 1 mg (has no administration in time range)   furosemide (LASIX) tablet 20 mg (has no administration in time range)   levothyroxine (SYNTHROID) tablet 112 mcg (has no administration in time range)   lisinopril (PRINIVIL;ZESTRIL) tablet 2.5 mg (has no administration in time range)   atorvastatin (LIPITOR) tablet 10 mg (has no administration in time range)   sodium chloride flush 0.9 % injection 5-40 mL (has no administration in time range)   sodium chloride flush 0.9 % injection 10 mL (has no administration in time range)   0.9 % sodium chloride infusion (has no administration in time range)   enoxaparin (LOVENOX) injection 40 mg (has no administration in time range)   promethazine (PHENERGAN) tablet 12.5 mg (has no administration in time range)     Or   ondansetron (ZOFRAN) injection 4 mg (has no administration in time range)   polyethylene glycol (GLYCOLAX) packet 17 g (has no administration in time range)   acetaminophen (TYLENOL) tablet 650 mg (has no administration in time range)     Or   acetaminophen (TYLENOL) suppository 650 mg (has no administration in time range)   oxyCODONE-acetaminophen (PERCOCET) 5-325 MG per tablet 1 tablet (has no administration in time range)     Or   oxyCODONE-acetaminophen (PERCOCET) 5-325 MG per tablet 2 tablet (has no administration in time range)   morphine (PF) injection 2 mg (has no administration in time range)   perflutren lipid microspheres (DEFINITY) injection 1.65 mg (has no administration in time range)   morphine sulfate (PF) injection 4 mg (4 mg Intravenous Given 5/24/21 1243)   furosemide (LASIX) injection 40 mg (40 mg Intravenous Given 5/24/21 1248)           The cardiac monitor revealed atrial flutter with a heart rate in the 40s as interpreted by me. The cardiac monitor was ordered secondary to the patient's fall and weakness and to monitor the patient for dysrhythmia. CPT [id number]           Medical Decision Making:     The patient was seen and evaluated by the Attending Emergency Medicine Physician Dr. Cordelia Artis. The patient is an 70-year-old male who presents to the emergency department complaining of weakness and a fall. The patient was bradycardic with a heart rate in the 40s to 50s and found to be in atrial flutter, EP consulted. Patient was found to have a right hip fracture, and orthopedics was consulted. BNP is also elevated chest x-ray did show evidence of vascular congestion. Patient was treated with Lasix and morphine. Consulted with medicine who accepted patient for admission. Discussed results and plan of care with patient and son at bedside verbalized understanding agreement treatment plan admission. Re-Evaluations:  ED Course as of May 24 2026   Mon May 24, 2021   1314 Consulted with Dr. Sherren Batman, hospitalist, who accepted for admission.      [KG]      ED Course User Index  [KG] Geovany Plaza DO         This patient's ED course included: a personal history and physicial examination, re-evaluation prior to disposition, multiple bedside re-evaluations, IV medications, cardiac monitoring, continuous pulse oximetry and complex medical decision making and emergency management    This patient has remained hemodynamically stable during their ED course. Consultations:    Spoke with Derinda Boxer, NP (Medicine). Discussed case. They will admit this patient. Spoke with ortho (Orthopedics). Discussed case. They will provide consultation. Spoke with EP (EP). Discussed case. They will provide consultation. Counseling: The emergency provider has spoken with the patient and discussed todays results, in addition to providing specific details for the plan of care and counseling regarding the diagnosis and prognosis. Questions are answered at this time and they are agreeable with the plan.       --------------------------------- IMPRESSION AND DISPOSITION ---------------------------------    IMPRESSION  1. Closed fracture of right hip, initial encounter ([facility] [address]    2. Fall, initial encounter    3. General weakness    4. Acute on chronic combined systolic and diastolic CHF (congestive heart failure) ([facility] [address]    5. [facility]        DISPOSITION  Disposition: Admit to telemetry  Patient condition is serious        NOTE: This report was transcribed using voice recognition software.  Every effort was made to ensure accuracy; however, inadvertent computerized transcription errors may be present        Cassi Little DO  Resident  05/24/21 2026

## 2021-05-24 NOTE — LETTER
[place] Department Medicaid  CERTIFICATION OF NECESSITY  FOR NON-EMERGENCY TRANSPORTATION   BY GROUND AMBULANCE      Individual Information   1. Name: Dianne Portlilo 2. [place] Medicaid Billing Number:    3. Address: [address]      Transportation Provider Information   4. Provider Name: [facility]   5. [place] Medicaid Provider Number:  National Provider Identifier (NPI):      Certification  7. Criteria:  During transport, this individual requires:  [x] Medical treatment or continuous     supervision by an EMT. [] The administration or regulation of oxygen by another person. [] Supervised protective restraint. 8. Period Beginning Date: 5/27/21   9. Length  [x] Not more than 30 day(s)  [] One Year     Additional Information Relevant to Certification   10. Comments or Explanations, If Necessary or Appropriate   S/p right femur open reduction internal fixation, unable to sit in wheelchair length of trip. Patient exhibits decreased strength, balance, coordination impairing functional ability. Certifying Practitioner Information   11. Name of Practitioner: Dr. Chris Gallagher   12. [place] Medicaid Provider Number, If Applicable: [address] Provider Identifier (NPI):      Signature Information   14. Date of Signature: 5/26/2021   15. Name of Person Signing: Electronically signed by Araseli Villalba RN on 5/26/2021 at 1:26 PM     16. Signature and Professional Designation: Electronically signed by Araseli Villalba RN on 5/26/2021 at 1:26 PM  Discharge Planner     [facility] [address]  Rev. 7/2015  [address] Encounter Date/Time: 5/24/2021 [facility] Account: [de-identified]    MRN: [id number]    Patient: Dianne Portillo    Contact Serial #: [id number]      ENCOUNTER          Patient Class: I Private Enc?   No Unit RM BD: SEY[facility] 4S PICU 4509/4509-B   Hospital Service: Intermediate   Encounter DX: Closed fracture proximal*   ADM Provider: Chris Gallagher MD   Procedure:     ATT Provider: Chris Gallagher MD   REF Provider:        Admission DX: Closed fracture proximal femur, transepiphyseal, right, initial encounter [facility]) and codes:       PATIENT                 Name: Shahida Aponte : 1940 (79 yrs)   Address: [place] [address] Sex: Male   City: [facility] [address]         Marital Status:    Employer: RETIRED         Congregation: [employer]   Primary Care Provider: Miguel Tierney MD         Primary Phone: [phone]   EMERGENCY CONTACT   Contact Name Legal Guardian? Relationship to Patient Home Phone Work Phone   1. Marco Gilmore  2. Samia Moreira    No Spouse  Child [phone]                 GUARANTOR            Guarantor: Shahida Aponte     : 1940   Address: [facility] Sex: Male     [place] [address]     Relation to Patient: Self       Home Phone: [phone]   Guarantor ID: [id number]       Work Phone:     Guarantor Employer: RETIRED         Status: RETIRED      COVERAGE        PRIMARY INSURANCE   Payor: [facility] MEDICARE Plan: CRISTEL HICKS*   Payor Address: [facility] [id number] [employer] [id number]       Group Number: [id number] Type: INDEMNITY   Subscriber Name: Akil No : 1940   Subscriber ID: [id number] [place]. Rel. to Sub: Self   SECONDARY INSURANCE   Payor:   Plan:     Payor Address:  ,           Group Number:   Insurance Type:     Subscriber Name:   Subscriber :     Subscriber ID:   Pat.  Rel. to Sub:

## 2021-05-25 ENCOUNTER — ANESTHESIA (OUTPATIENT)
Dept: OPERATING ROOM | Age: 81
DRG: 481 | End: 2021-05-25
Payer: MEDICARE

## 2021-05-25 PROBLEM — S72.001A CLOSED FRACTURE OF RIGHT HIP (HCC): Status: ACTIVE | Noted: 2021-05-24

## 2021-05-25 LAB
ANION GAP SERPL CALCULATED.3IONS-SCNC: 7 MMOL/L (ref 7–16)
BUN BLDV-MCNC: 19 MG/DL (ref 6–23)
CALCIUM SERPL-MCNC: 8.6 MG/DL (ref 8.6–10.2)
CHLORIDE BLD-SCNC: 100 MMOL/L (ref 98–107)
CO2: 30 MMOL/L (ref 22–29)
CREAT SERPL-MCNC: 1 MG/DL (ref 0.7–1.2)
GFR AFRICAN AMERICAN: >60
GFR NON-AFRICAN AMERICAN: >60 ML/MIN/1.73
GLUCOSE BLD-MCNC: 178 MG/DL (ref 74–99)
HCT VFR BLD CALC: 36.3 % (ref 37–54)
HEMOGLOBIN: 11.9 G/DL (ref 12.5–16.5)
MCH RBC QN AUTO: 34.3 PG (ref 26–35)
MCHC RBC AUTO-ENTMCNC: 32.8 % (ref 32–34.5)
MCV RBC AUTO: 104.6 FL (ref 80–99.9)
PDW BLD-RTO: 13.5 FL (ref 11.5–15)
PLATELET # BLD: 102 E9/L (ref 130–450)
PMV BLD AUTO: 12.6 FL (ref 7–12)
POTASSIUM REFLEX MAGNESIUM: 4 MMOL/L (ref 3.5–5)
RBC # BLD: 3.47 E12/L (ref 3.8–5.8)
SODIUM BLD-SCNC: 137 MMOL/L (ref 132–146)
WBC # BLD: 7.9 E9/L (ref 4.5–11.5)

## 2021-05-25 PROCEDURE — 6370000000 HC RX 637 (ALT 250 FOR IP): Performed by: INTERNAL MEDICINE

## 2021-05-25 PROCEDURE — 99223 1ST HOSP IP/OBS HIGH 75: CPT | Performed by: INTERNAL MEDICINE

## 2021-05-25 PROCEDURE — 86901 BLOOD TYPING SEROLOGIC RH(D): CPT

## 2021-05-25 PROCEDURE — 80048 BASIC METABOLIC PNL TOTAL CA: CPT

## 2021-05-25 PROCEDURE — 85027 COMPLETE CBC AUTOMATED: CPT

## 2021-05-25 PROCEDURE — 86900 BLOOD TYPING SEROLOGIC ABO: CPT

## 2021-05-25 PROCEDURE — APPSS60 APP SPLIT SHARED TIME 46-60 MINUTES: Performed by: PHYSICIAN ASSISTANT

## 2021-05-25 PROCEDURE — 86850 RBC ANTIBODY SCREEN: CPT

## 2021-05-25 PROCEDURE — 99233 SBSQ HOSP IP/OBS HIGH 50: CPT | Performed by: INTERNAL MEDICINE

## 2021-05-25 PROCEDURE — 36415 COLL VENOUS BLD VENIPUNCTURE: CPT

## 2021-05-25 PROCEDURE — 6370000000 HC RX 637 (ALT 250 FOR IP): Performed by: FAMILY MEDICINE

## 2021-05-25 PROCEDURE — 2580000003 HC RX 258: Performed by: INTERNAL MEDICINE

## 2021-05-25 PROCEDURE — 2060000000 HC ICU INTERMEDIATE R&B

## 2021-05-25 RX ORDER — LATANOPROST 50 UG/ML
1 SOLUTION/ DROPS OPHTHALMIC NIGHTLY
COMMUNITY

## 2021-05-25 RX ORDER — LATANOPROST 50 UG/ML
1 SOLUTION/ DROPS OPHTHALMIC NIGHTLY
Status: DISCONTINUED | OUTPATIENT
Start: 2021-05-25 | End: 2021-05-28 | Stop reason: HOSPADM

## 2021-05-25 RX ADMIN — LEVOTHYROXINE SODIUM 112 MCG: 0.11 TABLET ORAL at 20:44

## 2021-05-25 RX ADMIN — FERROUS SULFATE TAB 325 MG (65 MG ELEMENTAL FE) 325 MG: 325 (65 FE) TAB at 15:26

## 2021-05-25 RX ADMIN — OXYCODONE AND ACETAMINOPHEN 1 TABLET: 5; 325 TABLET ORAL at 15:26

## 2021-05-25 RX ADMIN — FOLIC ACID 1 MG: 1 TABLET ORAL at 15:26

## 2021-05-25 RX ADMIN — FUROSEMIDE 20 MG: 20 TABLET ORAL at 09:56

## 2021-05-25 RX ADMIN — Medication 10 ML: at 21:00

## 2021-05-25 RX ADMIN — LATANOPROST 1 DROP: 50 SOLUTION/ DROPS OPHTHALMIC at 20:44

## 2021-05-25 RX ADMIN — POLYETHYLENE GLYCOL 3350 17 G: 17 POWDER, FOR SOLUTION ORAL at 15:26

## 2021-05-25 ASSESSMENT — PAIN SCALES - GENERAL
PAINLEVEL_OUTOF10: 0
PAINLEVEL_OUTOF10: 0

## 2021-05-25 ASSESSMENT — ENCOUNTER SYMPTOMS: RESPIRATORY NEGATIVE: 1

## 2021-05-25 NOTE — PROGRESS NOTES
[facility]  [facility]  Report  PATIENT: Lars Izquierdo RECORD NUMBER: [id number]  DATE OF SERVICE:  5/25/2021  ATTENDING ELECTROPHYSIOLOGIST: Shaw Colorado MD   PRIMARY ELECTROPHYSIOLOGIST: Shaw Colorado MD   REFERRING PHYSICIAN: Ricardo Perez MD and Nu Alvarenga MD  CHIEF COMPLAINT: Fall and hip fracture. HPI (initial consult 5/24/21) : This is a 79 y.o. male who followed with Dr. Rajni Sargent and has not followed up with cardiology since the time of her departure. He reports a history of Coronary artery disease with a CABG X4 in the 1990's and PCI in 2012, Atrial fibrillation on Coreg 12.5 mg BID at home and no [address] [place] [address] Road for unclear reasons, Diabetes, Thyroid disease, Hypertension. He presented with recurrent Fall and was found to have a right intertrochanteric femur fracture. He reports no loss of consciousness, lightheadedness, chest pain, palpitation. He does endure significant fatigue and weakness. Upon presentation to the ER he was found to be in atrial flutter with a slow ventricular response he took his Coreg this morning and reports his last dose with this morning. Initial Laboratory evaluation includes Na 139, K 4.4, BUN 19, Cr 1.1, GFR >60, pro BNP 2,525, Trop 0.03, WBC 4.0, H/H 11.9/36.5, Plt 84,000, TSH 2.720,  Free T4 1.65, COVID-19 negative. Chest X ray cardiomegaly with mild CHF.     05/25/2021 Nathalia Loza is seen in follow-up. Family is at the bedside and report he is scheduled for surgery today. His wife was on speaker phone. He remains in atrial flutter with HRs  40s-60s bpm, no significant pause noted off Coreg following yesterday's am dose. He offers no complaints from an EP perspective. He denies periods of lightheaded and/or syncope at home.          Patient Active Problem List    Diagnosis Date Noted    Closed fracture of right hip ([facility] [address] 05/24/2021    Atrial flutter ([address] 05/24/2021    Bradycardia     Left cataract 2017       Past Medical History:   Diagnosis Date    Diabetes mellitus ([address]     History of blood transfusion     2014    Hypertension     Thyroid disease        History reviewed. No pertinent family history.     Social History     Tobacco Use    Smoking status: Former Smoker     Quit date:      Years since quittin.4    Smokeless tobacco: Never Used   Substance Use Topics    Alcohol use: Not on file       Current Facility-Administered Medications   Medication Dose Route Frequency Provider Last Rate Last Admin    ceFAZolin (ANCEF) 2000 mg in sterile water 20 mL IV syringe  2,000 mg Intravenous Once [address] Lew Blue DO        [START ON 2021] aspirin EC tablet 81 mg  81 mg Oral Daily Fernando Belle MD        ferrous sulfate (IRON 325) tablet 325 mg  325 mg Oral Lunch Fernando Belle MD   325 mg at 9    folic acid (FOLVITE) tablet 1 mg  1 mg Oral Lunch Fernando Belle MD   1 mg at 21    furosemide (LASIX) tablet 20 mg  20 mg Oral Daily Fernando Belle MD   20 mg at 21 0956    levothyroxine (SYNTHROID) tablet 112 mcg  112 mcg Oral Nightly Fernando Belle MD   112 mcg at 21 214    lisinopril (PRINIVIL;ZESTRIL) tablet 2.5 mg  2.5 mg Oral Daily Fernando Belle MD   2.5 mg at 21 214    atorvastatin (LIPITOR) tablet 10 mg  10 mg Oral Daily Fernando Belle MD        sodium chloride flush 0.9 % injection 5-40 mL  5-40 mL Intravenous 2 times per day Fernando Belle MD   10 mL at 21    sodium chloride flush 0.9 % injection 10 mL  10 mL Intravenous PRN Fernando Belle MD        0.9 % sodium chloride infusion  25 mL Intravenous PRN Fernando Belle MD        enoxaparin (LOVENOX) injection 40 mg  40 mg Subcutaneous Daily Fernando Belle MD        promethazine (PHENERGAN) tablet 12.5 mg  12.5 mg Oral Q6H PRN Fernando Belle MD        Or    ondansetron [facility] injection 4 mg  4 mg Intravenous Q6H PRN Mercedez Clements MD        polyethylene glycol [facility]) packet 17 g  17 g Oral Daily PRN Mercedez Clements MD        acetaminophen (TYLENOL) tablet 650 mg  650 mg Oral Q6H PRN Mercedez Clements MD        Or    acetaminophen (TYLENOL) suppository 650 mg  650 mg Rectal Q6H PRN Mercedez Clements MD        oxyCODONE-acetaminophen (PERCOCET) 5-325 MG per tablet 1 tablet  1 tablet Oral Q4H PRN Mercedez Clements MD   1 tablet at 05/24/21 2030    Or    oxyCODONE-acetaminophen (PERCOCET) 5-325 MG per tablet 2 tablet  2 tablet Oral Q4H PRN Mercedez Clements MD        morphine (PF) injection 2 mg  2 mg Intravenous Q4H PRN Mercedez Clements MD        perflutren lipid microspheres (DEFINITY) injection 1.65 mg  1.5 mL Intravenous ONCE PRN Ruben Ontiveros MD            No Known Allergies    Review of Systems   Respiratory: Negative. Cardiovascular: Negative. Musculoskeletal:        Fractured femur     Neurological: Negative. All other systems reviewed and are negative. PHYSICAL EXAM:   Vitals:    05/24/21 2144 05/25/21 0030 05/25/21 0641 05/25/21 0824   BP: 124/62 (!) 123/58 114/60 136/64   Pulse:  81 54 (!) 40   Resp:  20 16 18   Temp:  97.6 °F (36.4 °C) 97.7 °F (36.5 °C) 97.7 °F (36.5 °C)   TempSrc:  Temporal Oral Oral   SpO2:  95% 94% 95%   Weight:       Height:          Constitutional: Well-developed, no acute distress  Eyes: conjunctivae normal   Ears, Nose, Throat: oral mucosa moist, no cyanosis   CV: no JVD. Marylen Forrester. Normal S1S2 and no S3. No murmurs, rubs, or gallops.  PMI is nondisplaced  Lungs: clear to auscultation bilaterally, normal respiratory effort without used of accessory muscles  Abdomen: soft,   Musculoskeletal: fractured femur   Skin: warm, no rashes, pale    I have personally reviewed the laboratory, cardiac diagnostic and radiographic testing as outlined below:    Data:    Recent Labs     05/24/21  1008 05/25/21  0531   WBC 4.0* 7.9   HGB 11.9* 11.9*   HCT 36.5* 36.3*   PLT 84* 102*     Recent Labs     05/24/21  1008 05/25/21  0531    137   K 4.4 4.0    100   CO2 25 30*   BUN 19 19   CREATININE 1.1 1.0   CALCIUM 8.8 8.6      No results found for: MG  Recent Labs     05/24/21  1008   TSH 2.720     No results for input(s): INR in the last 72 hours. CXR 05/24/12: Cardiomegaly with mild CHF      EKG 5/24/21: Atrial flutter with HR of 42 bpm, RBBB, LAFB, QTc 449 msec. Telemetry 5/24/21: Atrial flutter with rates 40-55 bpm, no significant pauses    Telemetry 05/25/2021: atrial flutter with HRs 40s-60s bpm, no significant pause noted    Assessment/Plan:    1. Atrial flutter with slow ventricular response   - Unknown duration  - Asymptomatic   - [id number] 5 (Vasc, HTN, DM, Age), reports no OAC at home unclear if this was due to recurrent falls or bleeding.   - Unclear history of  rhythm control.  - Currently on Coreg 12.5 mg bid for rate control >> held 5/24/2021  - Avoid AV yimi blocking agents    2. Bradycardia   - AF with SVR  - Secondary to AV node blocking agents and underlying AV conduction disease.  - Doubt contributory to his recurrent falls per history but possibly related to his complaint of fatigue.  - HRs 40s-60s bpm off Coreg (5/24/21)    3. Bifascicular block  - RBBB and LAFB. - No definite complete AV block seen thus far. 4. Coronary artery disease   - History of CABG in 1990's and PCI in 2012. - On ASA, Coreg and Zocor.   - Coreg held 5/24/2021    5. Hypertension   - On Accupril, Lasix and Coreg.  - Coreg held 5/24/2021    6. Hypothyroidism   - On Synthroid   - TSH 2.720 (5/24/21)    7. Diabetes milltus   - On Glipizide    Recommendations:    1. Continue to hold Coreg (AV yimi blocking therapy) and monitor heart rates. 2. Can consider outpatient cardiac monitor at discharge. 3. From an EP perspective, OK for surgery.    4. Consider cardiology consult if cardiac clearance is needed pre-operatively. 5. Reported not a candidate for [address] due to frequent falls. 6. No indication for permanent pacemaker at this time. 7. Continue telemetry. Thank you for allowing me to participate in your patient's care. YONNY Matamoros - NP - discussed with Dr Suyapa Dinh  [facility]  10:03 AM  5/25/2021    Attending [de-identified] Statement    Patient seen with the ANP. Agree with the findings, assessment and plan. Management plan was discussed. I have personally interviewed the patient, independently performed a focused cardiac examination, reviewed the pertinent laboratory and diagnostic testing with the patient and directly participated in the medical decision-making as noted above with the following additions:     Remains in AF with HR 40-60 bpm. Denies any dizziness, LHD or syncope. Physical exam showed no JVD, IRIR, no murmur, clear lungs bilaterally, trace edema. Continue to hold off Coreg and AV node blocker agents. Await for echocardiogram result. OK to proceed with  orthopedic surgery per EP perspectives. Consider long term [address] after surgery if no definite contraindications.     Shaw Colorado MD  Cardiac Electrophysiology  [facility]  [facility]  9:06 PM  5/25/2021

## 2021-05-25 NOTE — CARE COORDINATION
Transition of Care-Met with patient and his family at bedside ( wife was on speaker phone) to discuss discharge planning. Patient had a past stay at the [facility] now [facility] and requested a referral, send information to liaison to review. Patient going to OR tomorrow for right femur open reduction internal fixation with Dr. Pool Rossi 5/25/2021. CM following.     Amanda LUCIAN, RN  [facility] [place]   [phone]

## 2021-05-25 NOTE — PROGRESS NOTES
Physical Therapy    PT consult to evaluate/treat received and appreciated. Pt chart reviewed and evaluation attempted. Pt is currently for \"right femur open reduction internal fixation with Dr. Margoth Lund 5/25/2021\". Will check back as able. Thank you.         Elias Bhakta, PT, DPT   [id number]

## 2021-05-25 NOTE — PROGRESS NOTES
05/26/2021     Hr varies bw 40 and 80 - no symptoms at rest   appreciate cards and EP inputs  Pt ok to proceed with surgery from medicine , cleared by cardiology - considered to be at risk   Will continue to hold coreg and probably dc at discharge      DVT Prophylaxis   PT/OT  Discharge Karen Martinez MD  1:07 PM  5/25/2021

## 2021-05-25 NOTE — CONSULTS
Patient seen and examined. Chart, labs, imaging studies, EKG and rhythm strips reviewed. Full consult to follow. We were asked to see the patient for cardiac risk stratification prior to orthopedic surgical intervention. IMPRESSION:  1. Patient is at least at intermediate risk for general anesthesia/surgery in view of his past cardiac history. Any cardiac testing prior to surgery, however will not change the recommendations. 2. CAD with history of CABG in the 1990s and stents around 2012, specifics unknown. Denies chest pain. Has history of CHF, but is not in CHF on exam. Questionable old inferior and anterior MI on EKG. Has not seen a cardiologist in a long time. 3. Atrial flutter with slow ventricular response, date of onset unclear. EP following. Deemed high risk for oral anticoagulation due to recurrent falls (ambulates with walker). 4. 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 5. Right hip fracture s/p fall. 6. History of hypertension. 7. Hyperlipidemia. 8. Type II diabetes mellitus. 9. Ex smoker. 10. Hypothyroidism, on replacement therapy. 11. Thrombocytopenia. 12. Mild anemia. 13. Cardiac murmur on exam.     PLAN:   1. May proceed with surgery without prior cardiac testing, as that will not change recommendations. 2. Avoid hypotension, hypoxia, severe anemia or aggressive fluid resuscitation in the perioperative period. 3. Will order echocardiogram post-op. 4. Will follow.      Electronically signed by Crystal Tamayo MD on 5/25/2021 at 10:00 AM

## 2021-05-25 NOTE — CONSULTS
Inpatient Cardiology Consultation      Reason for Consult: Cardiac risk stratification    Consulting Physician: Dr. Ambar Contreras    Requesting Physician: Dr. Sherren Batman    Date of Consultation: 5/25/2021    HISTORY OF PRESENT ILLNESS:   Patient is an 79year old WM who is not previously known to [address] [place] [address] Cardiology. He used to follow with Dr. Franky Hutchins in the past.  He is being seen in consultation this hospital admission by Dr. Ambar Contreras for cardiac risk stratification prior to orthopedic surgical intervention regarding right hip fracture. Patient has a known past medical history of former tobacco abuse, coronary artery disease status-post CABG x 4 in the 1990s with prior PCI in 2012 (details unknown, reports unavailable for review at this time), hypertension, hyperlipidemia, non-insulin requiring diabetes mellitus type II, hypothyroidism on replacement therapy, chronic heart failure with unknown ejection fraction. The patient himself, his wife and family present at bedside deny the patient having a past medical history of prior cardiac arrhythmia, including atrial fibrillation/flutter. He is not on oral anticoagulation at home. The patient admits to having recurrent mechanical falls as of recent. He presented to [facility] on May 24, 2021 after a mechanical fall at home. He states he has had four to five mechanical falls at home over the past month or two. He admits to generalized weakness and fatigue over the past couple of months. He also notes of chronic dyspnea on exertion, which is at his typical baseline, as well as mild peripheral edema in bilateral lower extremities. On day of hospital presentation, the patient states that he was attempting to walk throughout his home when he felt generally weak and fatigued --> his legs gave out and he fell to the floor. He states he fell onto his right side/right hip. He was able to call for help right away.   He denies any complaints of chest discomfort at rest or on exertion, shortness of breath at rest, nausea, emesis, diaphoresis, dizziness, palpitations, near-syncope or syncope. He states he has never lost consciousness with the falls. He has no symptoms of dizziness, lightheadedness or palpitations prior to the episodes. He denies paroxysmal nocturnal dyspnea, orthopnea. He is asymptomatic from a cardiac perspective during my time of interview/exam today. On admission, patient was found to have a right intertrochanteric femur fracture, and is scheduled for surgical intervention by orthopedic surgery service. He was also noted on admission to be in atrial fibrillation/flutter with slow ventricular response (reportedly asymptomatic). The patient himself, and his family deny any prior history of this rhythm disturbance. Family and social history as noted below. Labs and diagnostic testing as noted below. Please note: past medical records were reviewed per electronic medical record (EMR) - see detailed reports under Past Medical/ Surgical History. PAST MEDICAL HISTORY:    1. Former tobacco smoker. 2. Coronary artery disease status-post CABG x 4 in the 1990s. Prior PCI in 2012 (details unknown). 3. Hypertension. 4. Hyperlipidemia, on statin therapy. 5. Non-insulin requiring diabetes mellitus type II. 6. Frequent mechanical falls. 7. Hypothyroidism, on replacement therapy. 8. ?History of atrial fibrillation/flutter. 9. Chronic heart failure with unknown EF. 10. Medical non-compliance. PAST SURGICAL HISTORY:    Past Surgical History:   Procedure Laterality Date    CARDIAC VALVE REPLACEMENT  2014    CATARACT REMOVAL WITH IMPLANT Left 09/12/2017    CHOLECYSTECTOMY      OTHER SURGICAL HISTORY  1997    quadruple bypass        HOME MEDICATIONS:  Prior to Admission medications    Medication Sig Start Date End Date Taking?  Authorizing Provider   potassium chloride (KLOR-CON M) 20 MEQ extended release tablet Take 20 mEq by mouth Daily with lunch   Yes Historical Provider, MD   ferrous sulfate (IRON 325) 325 (65 Fe) MG tablet Take 325 mg by mouth Daily with lunch   Yes Historical Provider, MD   levothyroxine (SYNTHROID) 112 MCG tablet Take 112 mcg by mouth nightly    Yes Historical Provider, MD   aspirin 81 MG tablet Take 81 mg by mouth daily   Yes Historical Provider, MD   glipiZIDE (GLUCOTROL) 10 MG tablet Take 10 mg by mouth 2 times daily (before meals)   Yes Historical Provider, MD   furosemide (LASIX) 20 MG tablet Take 20 mg by mouth daily    Yes Historical Provider, MD   simvastatin (ZOCOR) 40 MG tablet Take 40 mg by mouth daily    Yes Historical Provider, MD   quinapril (ACCUPRIL) 5 MG tablet Take 5 mg by mouth nightly   Yes Historical Provider, MD   folic acid (FOLVITE) 1 MG tablet Take 1 mg by mouth Daily with lunch    Yes Historical Provider, MD       CURRENT MEDICATIONS:      Current Facility-Administered Medications:     ceFAZolin (ANCEF) 2000 mg in sterile water 20 mL IV syringe, 2,000 mg, Intravenous, Once, Mounika Gonzales DO    [START ON 5/26/2021] aspirin EC tablet 81 mg, 81 mg, Oral, Daily, Cathryn Aviles MD    ferrous sulfate (IRON 325) tablet 325 mg, 325 mg, Oral, Lunch, Cathryn Aviles MD, 325 mg at 58/16/62 4707    folic acid (FOLVITE) tablet 1 mg, 1 mg, Oral, Lunch, Cathryn Aviles MD, 1 mg at 05/24/21 2030    furosemide (LASIX) tablet 20 mg, 20 mg, Oral, Daily, Cathryn Aviles MD    levothyroxine (SYNTHROID) tablet 112 mcg, 112 mcg, Oral, Nightly, Cathryn Aviles MD, 112 mcg at 05/24/21 2142    lisinopril (PRINIVIL;ZESTRIL) tablet 2.5 mg, 2.5 mg, Oral, Daily, Cathryn Aviles MD, 2.5 mg at 05/24/21 2144    atorvastatin (LIPITOR) tablet 10 mg, 10 mg, Oral, Daily, Cathryn Aviles MD    sodium chloride flush 0.9 % injection 5-40 mL, 5-40 mL, Intravenous, 2 times per day, Cathryn Aviles MD, 10 mL at 05/24/21 2030    sodium chloride flush 0.9 % injection 10 mL, 10 mL, Intravenous, PRN, Cathryn Aviles MD  [facility]    PLAN:   1. May proceed with surgery without prior cardiac testing, as that will not change recommendations. 2. Avoid hypotension, hypoxia, severe anemia or aggressive fluid resuscitation in the perioperative period. 3. Will order echocardiogram post-op.   4. Will follow.      Electronically signed by Asim Henderson MD on 5/25/2021 at 10:00 AM

## 2021-05-26 ENCOUNTER — APPOINTMENT (OUTPATIENT)
Dept: GENERAL RADIOLOGY | Age: 81
DRG: 481 | End: 2021-05-26
Payer: MEDICARE

## 2021-05-26 VITALS
DIASTOLIC BLOOD PRESSURE: 50 MMHG | OXYGEN SATURATION: 100 % | SYSTOLIC BLOOD PRESSURE: 99 MMHG | TEMPERATURE: 93.4 F | RESPIRATION RATE: 17 BRPM

## 2021-05-26 DIAGNOSIS — R00.1 BRADYCARDIA: Primary | ICD-10-CM

## 2021-05-26 DIAGNOSIS — I48.92 ATRIAL FLUTTER, UNSPECIFIED TYPE (HCC): ICD-10-CM

## 2021-05-26 LAB
ABO/RH: NORMAL
ANTIBODY SCREEN: NORMAL

## 2021-05-26 PROCEDURE — 76942 ECHO GUIDE FOR BIOPSY: CPT | Performed by: ANESTHESIOLOGY

## 2021-05-26 PROCEDURE — 7100000000 HC PACU RECOVERY - FIRST 15 MIN: Performed by: ORTHOPAEDIC SURGERY

## 2021-05-26 PROCEDURE — 2720000010 HC SURG SUPPLY STERILE: Performed by: ORTHOPAEDIC SURGERY

## 2021-05-26 PROCEDURE — 6360000002 HC RX W HCPCS

## 2021-05-26 PROCEDURE — 2500000003 HC RX 250 WO HCPCS: Performed by: STUDENT IN AN ORGANIZED HEALTH CARE EDUCATION/TRAINING PROGRAM

## 2021-05-26 PROCEDURE — 6370000000 HC RX 637 (ALT 250 FOR IP): Performed by: STUDENT IN AN ORGANIZED HEALTH CARE EDUCATION/TRAINING PROGRAM

## 2021-05-26 PROCEDURE — 3E0T3BZ INTRODUCTION OF ANESTHETIC AGENT INTO PERIPHERAL NERVES AND PLEXI, PERCUTANEOUS APPROACH: ICD-10-PCS | Performed by: ANESTHESIOLOGY

## 2021-05-26 PROCEDURE — 6360000002 HC RX W HCPCS: Performed by: ANESTHESIOLOGY

## 2021-05-26 PROCEDURE — 6360000002 HC RX W HCPCS: Performed by: PHYSICAL THERAPY ASSISTANT

## 2021-05-26 PROCEDURE — 27245 TREAT THIGH FRACTURE: CPT | Performed by: ORTHOPAEDIC SURGERY

## 2021-05-26 PROCEDURE — 99233 SBSQ HOSP IP/OBS HIGH 50: CPT | Performed by: INTERNAL MEDICINE

## 2021-05-26 PROCEDURE — 6370000000 HC RX 637 (ALT 250 FOR IP): Performed by: INTERNAL MEDICINE

## 2021-05-26 PROCEDURE — 6360000002 HC RX W HCPCS: Performed by: STUDENT IN AN ORGANIZED HEALTH CARE EDUCATION/TRAINING PROGRAM

## 2021-05-26 PROCEDURE — C1713 ANCHOR/SCREW BN/BN,TIS/BN: HCPCS | Performed by: ORTHOPAEDIC SURGERY

## 2021-05-26 PROCEDURE — 2580000003 HC RX 258: Performed by: STUDENT IN AN ORGANIZED HEALTH CARE EDUCATION/TRAINING PROGRAM

## 2021-05-26 PROCEDURE — 3209999900 FLUORO FOR SURGICAL PROCEDURES

## 2021-05-26 PROCEDURE — 2709999900 HC NON-CHARGEABLE SUPPLY: Performed by: ORTHOPAEDIC SURGERY

## 2021-05-26 PROCEDURE — 3600000015 HC SURGERY LEVEL 5 ADDTL 15MIN: Performed by: ORTHOPAEDIC SURGERY

## 2021-05-26 PROCEDURE — 7100000001 HC PACU RECOVERY - ADDTL 15 MIN: Performed by: ORTHOPAEDIC SURGERY

## 2021-05-26 PROCEDURE — 2500000003 HC RX 250 WO HCPCS

## 2021-05-26 PROCEDURE — 3700000001 HC ADD 15 MINUTES (ANESTHESIA): Performed by: ORTHOPAEDIC SURGERY

## 2021-05-26 PROCEDURE — C1769 GUIDE WIRE: HCPCS | Performed by: ORTHOPAEDIC SURGERY

## 2021-05-26 PROCEDURE — 3700000000 HC ANESTHESIA ATTENDED CARE: Performed by: ORTHOPAEDIC SURGERY

## 2021-05-26 PROCEDURE — 0QS604Z REPOSITION RIGHT UPPER FEMUR WITH INTERNAL FIXATION DEVICE, OPEN APPROACH: ICD-10-PCS | Performed by: ORTHOPAEDIC SURGERY

## 2021-05-26 PROCEDURE — 2060000000 HC ICU INTERMEDIATE R&B

## 2021-05-26 PROCEDURE — 3600000005 HC SURGERY LEVEL 5 BASE: Performed by: ORTHOPAEDIC SURGERY

## 2021-05-26 PROCEDURE — 2580000003 HC RX 258

## 2021-05-26 DEVICE — SCREW TFNA FEN 100MM: Type: IMPLANTABLE DEVICE | Site: HIP | Status: FUNCTIONAL

## 2021-05-26 DEVICE — SCREW BNE L40MM DIA5MM ST TIB LT GRN TI ST CANN LOK FULL: Type: IMPLANTABLE DEVICE | Site: HIP | Status: FUNCTIONAL

## 2021-05-26 DEVICE — NAIL IM L170MM DIA11MM NK 125DEG SHT PROX FEM GRN TI-15MO: Type: IMPLANTABLE DEVICE | Site: HIP | Status: FUNCTIONAL

## 2021-05-26 RX ORDER — SODIUM CHLORIDE 9 MG/ML
INJECTION, SOLUTION INTRAVENOUS CONTINUOUS PRN
Status: DISCONTINUED | OUTPATIENT
Start: 2021-05-26 | End: 2021-05-26 | Stop reason: SDUPTHER

## 2021-05-26 RX ORDER — OXYCODONE HYDROCHLORIDE AND ACETAMINOPHEN 5; 325 MG/1; MG/1
2 TABLET ORAL PRN
Status: DISCONTINUED | OUTPATIENT
Start: 2021-05-26 | End: 2021-05-26

## 2021-05-26 RX ORDER — ONDANSETRON 2 MG/ML
INJECTION INTRAMUSCULAR; INTRAVENOUS PRN
Status: DISCONTINUED | OUTPATIENT
Start: 2021-05-26 | End: 2021-05-26 | Stop reason: SDUPTHER

## 2021-05-26 RX ORDER — MIDAZOLAM HYDROCHLORIDE 2 MG/2ML
1 INJECTION, SOLUTION INTRAMUSCULAR; INTRAVENOUS PRN
Status: DISCONTINUED | OUTPATIENT
Start: 2021-05-26 | End: 2021-05-26

## 2021-05-26 RX ORDER — MORPHINE SULFATE 2 MG/ML
1 INJECTION, SOLUTION INTRAMUSCULAR; INTRAVENOUS EVERY 5 MIN PRN
Status: DISCONTINUED | OUTPATIENT
Start: 2021-05-26 | End: 2021-05-26

## 2021-05-26 RX ORDER — PHENYLEPHRINE HCL IN 0.9% NACL 1 MG/10 ML
SYRINGE (ML) INTRAVENOUS PRN
Status: DISCONTINUED | OUTPATIENT
Start: 2021-05-26 | End: 2021-05-26 | Stop reason: SDUPTHER

## 2021-05-26 RX ORDER — ACETAMINOPHEN 500 MG
1000 TABLET ORAL ONCE
Status: CANCELLED | OUTPATIENT
Start: 2021-05-26 | End: 2021-05-26

## 2021-05-26 RX ORDER — MORPHINE SULFATE 2 MG/ML
2 INJECTION, SOLUTION INTRAMUSCULAR; INTRAVENOUS EVERY 5 MIN PRN
Status: DISCONTINUED | OUTPATIENT
Start: 2021-05-26 | End: 2021-05-26

## 2021-05-26 RX ORDER — CEFAZOLIN SODIUM 1 G/3ML
INJECTION, POWDER, FOR SOLUTION INTRAMUSCULAR; INTRAVENOUS PRN
Status: DISCONTINUED | OUTPATIENT
Start: 2021-05-26 | End: 2021-05-26 | Stop reason: SDUPTHER

## 2021-05-26 RX ORDER — ONDANSETRON 2 MG/ML
4 INJECTION INTRAMUSCULAR; INTRAVENOUS
Status: DISCONTINUED | OUTPATIENT
Start: 2021-05-26 | End: 2021-05-26

## 2021-05-26 RX ORDER — ROCURONIUM BROMIDE 10 MG/ML
INJECTION, SOLUTION INTRAVENOUS PRN
Status: DISCONTINUED | OUTPATIENT
Start: 2021-05-26 | End: 2021-05-26 | Stop reason: SDUPTHER

## 2021-05-26 RX ORDER — DEXAMETHASONE SODIUM PHOSPHATE 10 MG/ML
INJECTION INTRAMUSCULAR; INTRAVENOUS PRN
Status: DISCONTINUED | OUTPATIENT
Start: 2021-05-26 | End: 2021-05-26 | Stop reason: SDUPTHER

## 2021-05-26 RX ORDER — OXYCODONE HYDROCHLORIDE AND ACETAMINOPHEN 5; 325 MG/1; MG/1
1 TABLET ORAL EVERY 6 HOURS PRN
Qty: 28 TABLET | Refills: 0 | Status: SHIPPED | OUTPATIENT
Start: 2021-05-26 | End: 2021-06-02

## 2021-05-26 RX ORDER — ROPIVACAINE HYDROCHLORIDE 5 MG/ML
30 INJECTION, SOLUTION EPIDURAL; INFILTRATION; PERINEURAL ONCE
Status: COMPLETED | OUTPATIENT
Start: 2021-05-26 | End: 2021-05-26

## 2021-05-26 RX ORDER — MEPERIDINE HYDROCHLORIDE 25 MG/ML
12.5 INJECTION INTRAMUSCULAR; INTRAVENOUS; SUBCUTANEOUS
Status: DISCONTINUED | OUTPATIENT
Start: 2021-05-26 | End: 2021-05-26

## 2021-05-26 RX ORDER — OXYCODONE HYDROCHLORIDE AND ACETAMINOPHEN 5; 325 MG/1; MG/1
1 TABLET ORAL PRN
Status: DISCONTINUED | OUTPATIENT
Start: 2021-05-26 | End: 2021-05-26

## 2021-05-26 RX ORDER — LIDOCAINE HYDROCHLORIDE 20 MG/ML
INJECTION, SOLUTION INTRAVENOUS PRN
Status: DISCONTINUED | OUTPATIENT
Start: 2021-05-26 | End: 2021-05-26 | Stop reason: SDUPTHER

## 2021-05-26 RX ORDER — ROPIVACAINE HYDROCHLORIDE 5 MG/ML
INJECTION, SOLUTION EPIDURAL; INFILTRATION; PERINEURAL
Status: COMPLETED | OUTPATIENT
Start: 2021-05-26 | End: 2021-05-26

## 2021-05-26 RX ORDER — FENTANYL CITRATE 50 UG/ML
INJECTION, SOLUTION INTRAMUSCULAR; INTRAVENOUS PRN
Status: DISCONTINUED | OUTPATIENT
Start: 2021-05-26 | End: 2021-05-26 | Stop reason: SDUPTHER

## 2021-05-26 RX ORDER — PROPOFOL 10 MG/ML
INJECTION, EMULSION INTRAVENOUS PRN
Status: DISCONTINUED | OUTPATIENT
Start: 2021-05-26 | End: 2021-05-26 | Stop reason: SDUPTHER

## 2021-05-26 RX ORDER — EPHEDRINE SULFATE/0.9% NACL/PF 50 MG/5 ML
SYRINGE (ML) INTRAVENOUS PRN
Status: DISCONTINUED | OUTPATIENT
Start: 2021-05-26 | End: 2021-05-26 | Stop reason: SDUPTHER

## 2021-05-26 RX ADMIN — CEFAZOLIN 2000 MG: 10 INJECTION, POWDER, FOR SOLUTION INTRAVENOUS at 20:46

## 2021-05-26 RX ADMIN — DEXAMETHASONE SODIUM PHOSPHATE 10 MG: 10 INJECTION INTRAMUSCULAR; INTRAVENOUS at 14:42

## 2021-05-26 RX ADMIN — Medication 100 MCG: at 14:28

## 2021-05-26 RX ADMIN — PROPOFOL 150 MG: 10 INJECTION, EMULSION INTRAVENOUS at 14:19

## 2021-05-26 RX ADMIN — FENTANYL CITRATE 50 MCG: 50 INJECTION, SOLUTION INTRAMUSCULAR; INTRAVENOUS at 14:19

## 2021-05-26 RX ADMIN — Medication 10 MG: at 14:27

## 2021-05-26 RX ADMIN — ATORVASTATIN CALCIUM 10 MG: 10 TABLET, FILM COATED ORAL at 10:24

## 2021-05-26 RX ADMIN — ROCURONIUM BROMIDE 10 MG: 10 INJECTION, SOLUTION INTRAVENOUS at 14:19

## 2021-05-26 RX ADMIN — HYDROMORPHONE HYDROCHLORIDE 0.5 MG: 1 INJECTION, SOLUTION INTRAMUSCULAR; INTRAVENOUS; SUBCUTANEOUS at 16:08

## 2021-05-26 RX ADMIN — ROPIVACAINE HYDROCHLORIDE 30 ML: 5 INJECTION, SOLUTION EPIDURAL; INFILTRATION; PERINEURAL at 13:15

## 2021-05-26 RX ADMIN — Medication 50 MCG: at 14:32

## 2021-05-26 RX ADMIN — LATANOPROST 1 DROP: 50 SOLUTION/ DROPS OPHTHALMIC at 20:47

## 2021-05-26 RX ADMIN — ROPIVACAINE HYDROCHLORIDE 30 ML: 5 INJECTION EPIDURAL; INFILTRATION; PERINEURAL at 13:30

## 2021-05-26 RX ADMIN — Medication 10 ML: at 20:47

## 2021-05-26 RX ADMIN — MIDAZOLAM 1 MG: 1 INJECTION INTRAMUSCULAR; INTRAVENOUS at 13:10

## 2021-05-26 RX ADMIN — FUROSEMIDE 20 MG: 20 TABLET ORAL at 10:24

## 2021-05-26 RX ADMIN — SODIUM CHLORIDE: 9 INJECTION, SOLUTION INTRAVENOUS at 15:18

## 2021-05-26 RX ADMIN — Medication 100 MCG: at 14:39

## 2021-05-26 RX ADMIN — SODIUM CHLORIDE: 9 INJECTION, SOLUTION INTRAVENOUS at 14:10

## 2021-05-26 RX ADMIN — CEFAZOLIN 2000 MG: 1 INJECTION, POWDER, FOR SOLUTION INTRAMUSCULAR; INTRAVENOUS at 14:23

## 2021-05-26 RX ADMIN — LIDOCAINE HYDROCHLORIDE 60 MG: 20 INJECTION, SOLUTION INTRAVENOUS at 14:19

## 2021-05-26 RX ADMIN — HYDROMORPHONE HYDROCHLORIDE 0.5 MG: 1 INJECTION, SOLUTION INTRAMUSCULAR; INTRAVENOUS; SUBCUTANEOUS at 15:56

## 2021-05-26 RX ADMIN — LISINOPRIL 2.5 MG: 5 TABLET ORAL at 10:24

## 2021-05-26 RX ADMIN — ONDANSETRON HYDROCHLORIDE 4 MG: 2 INJECTION, SOLUTION INTRAMUSCULAR; INTRAVENOUS at 15:16

## 2021-05-26 RX ADMIN — LEVOTHYROXINE SODIUM 112 MCG: 0.11 TABLET ORAL at 20:47

## 2021-05-26 ASSESSMENT — PAIN DESCRIPTION - PAIN TYPE
TYPE: SURGICAL PAIN

## 2021-05-26 ASSESSMENT — PULMONARY FUNCTION TESTS
PIF_VALUE: 14
PIF_VALUE: 13
PIF_VALUE: 15
PIF_VALUE: 15
PIF_VALUE: 14
PIF_VALUE: 2
PIF_VALUE: 16
PIF_VALUE: 14
PIF_VALUE: 15
PIF_VALUE: 13
PIF_VALUE: 15
PIF_VALUE: 16
PIF_VALUE: 16
PIF_VALUE: 1
PIF_VALUE: 16
PIF_VALUE: 1
PIF_VALUE: 15
PIF_VALUE: 13
PIF_VALUE: 15
PIF_VALUE: 13
PIF_VALUE: 13
PIF_VALUE: 16
PIF_VALUE: 16
PIF_VALUE: 13
PIF_VALUE: 16
PIF_VALUE: 15
PIF_VALUE: 16
PIF_VALUE: 14
PIF_VALUE: 16
PIF_VALUE: 17
PIF_VALUE: 16
PIF_VALUE: 15
PIF_VALUE: 16
PIF_VALUE: 4
PIF_VALUE: 15
PIF_VALUE: 13
PIF_VALUE: 2
PIF_VALUE: 22

## 2021-05-26 ASSESSMENT — PAIN DESCRIPTION - DESCRIPTORS
DESCRIPTORS: ACHING;DISCOMFORT

## 2021-05-26 ASSESSMENT — LIFESTYLE VARIABLES: SMOKING_STATUS: 0

## 2021-05-26 ASSESSMENT — PAIN DESCRIPTION - ORIENTATION
ORIENTATION: RIGHT

## 2021-05-26 ASSESSMENT — PAIN DESCRIPTION - LOCATION
LOCATION: HIP
LOCATION: HIP

## 2021-05-26 ASSESSMENT — PAIN SCALES - GENERAL: PAINLEVEL_OUTOF10: 9

## 2021-05-26 ASSESSMENT — ENCOUNTER SYMPTOMS: RESPIRATORY NEGATIVE: 1

## 2021-05-26 NOTE — PROGRESS NOTES
Occupational Therapy  Received OT order, Reviewed Chart. Noted pt did not undergo surgical repair of R Hip last date and is tentatively scheduled for surgical repair of R Hip Fracture this Date. Will Hold OT assessment until next date for pt's safety.   Thank you for this referral. Darell Ford, MOT, OTR/L  # [id number]

## 2021-05-26 NOTE — OP NOTE
Operative Note      Patient: Reggie Azul  YOB: 1940  MRN: [id number]    Date of Procedure: 5/26/2021    Pre-Op Diagnosis: Right femur intertrochanteric fracture     Post-Op Diagnosis: Same       Procedure(s): FEMUR OPEN REDUCTION INTERNAL FIXATION    Surgeon(s):  Issac Hendrickson DO    Assistant:   Resident: Gisselle Flowers DO; Rico Concepcion DO; Rajan Hassan DO    Anesthesia: General    Estimated Blood Loss (mL): less than 371     Complications: None    Specimens:   * No specimens in log *    Implants:  Implant Name Type Inv. Item Serial No.  Lot No. LRB No. Used Action   NAIL IM [id number] [id number] 125DEG SHT PROX FEM GRN [id number]  NAIL IM L170MM [id number] NK 125DEG SHT PROX FEM GRN [id number]  DEPUY [employer]- [id number] Left 1 Implanted   SCREW TFNA FEN 100MM Leg/Ankle/Foot/Toe SCREW TFNA FEN 100MM  SYNTHES-PMM [id number] Left 1 Implanted   SCREW BNE L40MM DIA5MM ST TIB LT GRN TI ST MARY HIRO FULL  SCREW BNE L40MM DIA5MM ST TIB LT GRN TI ST [place]IRO FULL  DEPUY [employer]- [id number] Left 1 Implanted         Drains:   Urethral Catheter Straight-tip (Active)   Catheter Indications Need for fluid volume management of the critically ill patient in a critical care setting 05/25/21 1500   Urine Color Yellow 05/25/21 1500   Urine Appearance Clear 05/25/21 1500   Output (mL) 400 mL 05/25/21 2044       Detailed Description of Procedure:   OPERATIVE NOTE  HISTORY: The patient is a 79y.o. year old male with history of above preop diagnosis. Diagnosis and typical prognosis from injury were discussed in detail. I explained the risk, benefits, expected outcome, and alternatives to the procedure. I have explained the risks and complications of the recommended surgery with the patient at length, as well as discussed potential treatment alternatives including nonoperative management.  These risks include but are not limited to death or complication from anesthesia, continued pain, nerve tendon or the tip to apex distance was less than 25 mm the length of the pin was measured. This tract was then reamed. The appropriate size screw was then inserted. The nail was locked from the top down. It was then backed off a quarter turn and compressed to compress the fracture. This guide was then removed and the guide for the distal locking screw was inserted through a small stab incision. The length was measured and appropriate screw was placed. Final x-rays were taken showing appropriate positioning of the nail with near anatomic alignment of the fracture. The incisions were then copiously irrigated with sterile normal saline. The deep tissues were closed with #0 Vicryl in a watertight fashion. The subcutaneous tissues and skin were closed with 2-0 Vicryl in an interrupted fashion. The incision sites were then dressed in a sterile manner. The patient's compartments were soft and compressible at the end of this portion of the procedure. The patient then had her legs removed from all devices. The patient was moved safely back to a hospital room bed, by multiple  individuals. The patient was taken to the PACU in stable condition. DISPOSITION: The patient was taken to PACU in stable condition. Once stable, the patient will be transferred to the floor. Orders have been provided to begin physical therapy, weight bear as tolerated Right lower extremity. Patient received a dose of ancef preoperatively. We will continue this for 24 hours postoperatively for infection prophylaxis. The patient will also be started on chemical DVT prophylaxis. We have consulted  and case management for discharge planning and consulted the PCP for medical management. Electronically Signed By  Lor Bennett D.O.  5/26/2021    NOTE: This report was transcribed using voice recognition software.  Every effort was made to ensure accuracy; however, inadvertent computerized transcription errors may be present

## 2021-05-26 NOTE — ANESTHESIA PROCEDURE NOTES
Peripheral Block    Patient location during procedure: pre-op  Staffing  Performed: anesthesiologist   Anesthesiologist: Rachel Booker DO  Preanesthetic Checklist  Completed: patient identified, IV checked, site marked, risks and benefits discussed, surgical consent, monitors and equipment checked, pre-op evaluation, timeout performed, anesthesia consent given, oxygen available and patient being monitored  Peripheral Block  Patient position: supine  Prep: ChloraPrep  Patient monitoring: continuous pulse ox, frequent blood pressure checks, IV access and cardiac monitor  Block type: Fascia iliaca and Femoral  Laterality: right  Injection technique: single-shot  Guidance: ultrasound guided  Local infiltration: ropivacaine  Infiltration strength: 0.5 %  Dose: 30 mL  Provider prep: mask, sterile gloves and sterile gown  Local infiltration: ropivacaine  Needle  Needle type: combined needle/nerve stimulator   Needle gauge: 22 G  Needle length: 10 cm  Needle localization: ultrasound guidance  Assessment  Injection assessment: negative aspiration for heme, no paresthesia on injection and local visualized surrounding nerve on ultrasound  Paresthesia pain: none  Slow fractionated injection: yes  Hemodynamics: stable  Additional Notes  Time out performed. Well tolerated.   Medications Administered  Ropivacaine (NAROPIN) injection 0.5%, 30 mL  Reason for block: post-op pain management

## 2021-05-26 NOTE — PROGRESS NOTES
Subjective: The patient is awake and alert. No acute events overnight. Denies chest pain, angina, SOB   Anxious to have surgery   Pain is well controlled    Objective:    BP (!) 112/53   Pulse (!) 48   Temp 98.1 °F (36.7 °C) (Oral)   Resp 16   Ht 5' 9\" (1.753 m)   Wt 179 lb (81.2 kg)   SpO2 94%   BMI 26.43 kg/m²     In: -   Out: 750   In: -   Out: 750 [Urine:750]    General appearance: NAD, conversant  HEENT: AT/NC, MMM  Neck: FROM, supple  Lungs: Clear to auscultation  CV: RRR, no MRGs  Vasc: Radial pulses 2+  Abdomen: Soft, non-tender; no masses or HSM  Extremities: No peripheral edema or digital cyanosis, right leg shortened and externally rotated. Skin: no rash, lesions or ulcers  Psych: Alert and oriented to person, place and time  Neuro: Alert and interactive     Recent Labs     05/24/21  1008 05/25/21  0531   WBC 4.0* 7.9   HGB 11.9* 11.9*   HCT 36.5* 36.3*   PLT 84* 102*       Recent Labs     05/24/21  1008 05/25/21  0531    137   K 4.4 4.0    100   CO2 25 30*   BUN 19 19   CREATININE 1.1 1.0   CALCIUM 8.8 8.6       Assessment:    Principal Problem:    Closed fracture of right hip (HCC)  Active Problems:    Atrial flutter (HCC)    Bradycardia  Resolved Problems:    * No resolved hospital problems. *      Plan:  79year old male admitted with mechanical fall at home  Closed right fracture of proximal femur  - Pain management   - Hold anticoagulants until decision on surgical procedure  - NPO until definitive decision for surgical procedure if not today then ok for carb controlled, cardiac diet. - Orthopedic surgery following procedure today     Diabetes Mellitus  - Insulin sliding scale for glucose control  - hypoglycemia protocol     Atrial flutter  -  81 mg as a- hold prior to  surgery    - 2017 stent placement  2001 CABG X4  - Unable to obtain any cardiology records.    - Check TSH     CHF  - Diurese cautiously so as to avoid renal's insufficiency Lasix 40 mg IV  - Daily weights strict I's and O's  - BNP on 05/26/2021     Hr varies bw 40 and 80 - no symptoms at rest   appreciate cards and EP inputs  Pt ok to proceed with surgery from medicine , cleared by cardiology - considered to be at risk   Will continue to hold coreg and probably dc at discharge      DVT Prophylaxis - PCD's   PT/OT  Discharge planning - likely Yadira 67, APRN - CNP  7:46 AM  5/26/2021     Had surgery today   Right FEMUR OPEN REDUCTION INTERNAL FIXATION  oac , pain control   Will not resume av yimi blockers     Dc plan to [facility]     I personally saw, examined and provided care for the patient. Radiographs, labs and medication list were reviewed by me independently. The case was discussed in detail and plans for care were established. Review of [address] [place] [address], documentation was conducted and revisions were made as appropriate directly by me. I agree with the above documented exam, problem list, and plan of care.      Gordo Larsen MD  6:52 PM  5/26/2021

## 2021-05-26 NOTE — ANESTHESIA POSTPROCEDURE EVALUATION
Department of Anesthesiology  Postprocedure Note    Patient: Juan Ruiz  MRN: [id number]  YOB: 1940  Date of evaluation: 5/26/2021  Time:  5:20 PM     Procedure Summary     Date: 05/26/21 Room / Location: [facility] OR 08 / [facility]    Anesthesia Start: 2470 Anesthesia Stop: 6295    Procedure: FEMUR OPEN REDUCTION INTERNAL FIXATION (Right Hip) Diagnosis: (FRACTURE)    Surgeons: Iban Alcantara DO Responsible Provider: Rianna Bravo DO    Anesthesia Type: general, regional ASA Status: 3          Anesthesia Type: general, regional    Margaret Phase I: Margaret Score: 8    Margaret Phase II:      Last vitals: Reviewed and per EMR flowsheets.        Anesthesia Post Evaluation    Patient location during evaluation: PACU  Patient participation: complete - patient participated  Level of consciousness: awake and alert  Airway patency: patent  Nausea & Vomiting: no nausea and no vomiting  Complications: no  Cardiovascular status: blood pressure returned to baseline  Respiratory status: acceptable  Hydration status: euvolemic

## 2021-05-26 NOTE — PROGRESS NOTES
[place] [address] Electrophysiology  Inpatient  Report  PATIENT: Lars Izquierdo RECORD NUMBER: [id number]  DATE OF SERVICE:  5/26/2021  ATTENDING ELECTROPHYSIOLOGIST: Shaw Colorado MD   PRIMARY ELECTROPHYSIOLOGIST: Shaw Colorado MD   REFERRING PHYSICIAN: Ricardo Perez MD and Nu Alvarenga MD  CHIEF COMPLAINT: Fall and hip fracture. HPI (initial consult 5/24/21) : This is a 79 y.o. male who followed with Dr. Rajni Sargent and has not followed up with cardiology since the time of her departure. He reports a history of Coronary artery disease with a CABG X4 in the 1990's and PCI in 2012, Atrial fibrillation on Coreg 12.5 mg BID at home and no [address] [place] [address] Road for unclear reasons, Diabetes, Thyroid disease, Hypertension. He presented with recurrent Fall and was found to have a right intertrochanteric femur fracture. He reports no loss of consciousness, lightheadedness, chest pain, palpitation. He does endure significant fatigue and weakness. Upon presentation to the ER he was found to be in atrial flutter with a slow ventricular response he took his Coreg this morning and reports his last dose with this morning. Initial Laboratory evaluation includes Na 139, K 4.4, BUN 19, Cr 1.1, GFR >60, pro BNP 2,525, Trop 0.03, WBC 4.0, H/H 11.9/36.5, Plt 84,000, TSH 2.720,  Free T4 1.65, COVID-19 negative. Chest X ray cardiomegaly with mild CHF.     05/25/2021 Nathalia Loza is seen in follow-up. Family is at the bedside and report he is scheduled for surgery today. His wife was on speaker phone. He remains in atrial flutter with HRs  40s-60s bpm, no significant pause noted off Coreg following yesterday's am dose. He offers no complaints from an EP perspective. He denies periods of lightheaded and/or syncope at home. 05/25/2021 Nathalia Loza is resting without cardiac complaint. No family is present. Plan for surgery today; no pre-op cardiac testing per Cardiology. ophthalmic solution 1 drop  1 drop Both Eyes Nightly Skip YONYN Aguiar - CNP   1 drop at 05/25/21 2044    aspirin EC tablet 81 mg  81 mg Oral Daily Emma Conn MD        ferrous sulfate (IRON 325) tablet 325 mg  325 mg Oral Lunch Emma Conn MD   325 mg at 11/42/95 5407    folic acid (FOLVITE) tablet 1 mg  1 mg Oral Lunch Emma Conn MD   1 mg at 05/25/21 1526    furosemide (LASIX) tablet 20 mg  20 mg Oral Daily Emma Conn MD   20 mg at 05/26/21 1024    levothyroxine (SYNTHROID) tablet 112 mcg  112 mcg Oral Nightly Emma Conn MD   112 mcg at 05/25/21 2044    lisinopril (PRINIVIL;ZESTRIL) tablet 2.5 mg  2.5 mg Oral Daily Emma Conn MD   2.5 mg at 05/26/21 1024    atorvastatin (LIPITOR) tablet 10 mg  10 mg Oral Daily Emma Conn MD   10 mg at 05/26/21 1024    sodium chloride flush 0.9 % injection 5-40 mL  5-40 mL Intravenous 2 times per day Emma Conn MD   10 mL at 05/25/21 2100    sodium chloride flush 0.9 % injection 10 mL  10 mL Intravenous PRN Emma Conn MD        0.9 % sodium chloride infusion  25 mL Intravenous PRN Emma Conn MD        enoxaparin (LOVENOX) injection 40 mg  40 mg Subcutaneous Daily Emma Conn MD        promethazine (PHENERGAN) tablet 12.5 mg  12.5 mg Oral Q6H PRN Emma Conn MD        Or    ondansetron [facility]) injection 4 mg  4 mg Intravenous Q6H PRN Emma Conn MD        polyethylene glycol [facility]) packet 17 g  17 g Oral Daily PRN Emma Conn MD   17 g at 05/25/21 1526    acetaminophen (TYLENOL) tablet 650 mg  650 mg Oral Q6H PRN Emma Conn MD        Or    acetaminophen (TYLENOL) suppository 650 mg  650 mg Rectal Q6H PRN Emma Conn MD        oxyCODONE-acetaminophen (PERCOCET) 5-325 MG per tablet 1 tablet  1 tablet Oral Q4H PRN Emma Conn MD   1 tablet at 05/25/21 1526    Or    oxyCODONE-acetaminophen (PERCOCET) 5-325 MG per tablet 2 tablet  2 tablet Oral Q4H PRN Emma Conn MD        morphine (PF) injection 2 mg  2 mg Intravenous Q4H PRN Emma Conn MD        perflutren lipid microspheres (DEFINITY) injection 1.65 mg  1.5 mL Intravenous ONCE PRN Nguyen Pickard MD         Facility-Administered Medications Ordered in Other Encounters   Medication Dose Route Frequency Provider Last Rate Last Admin    propofol injection   Intravenous PRN Jb Quiroz RN   150 mg at 05/26/21 1419    rocuronium (ZEMURON) injection   Intravenous PRN Jb Quiroz RN   10 mg at 05/26/21 1419    lidocaine (cardiac) (XYLOCAINE) injection   Intravenous PRN Jb Quiroz RN   60 mg at 05/26/21 1419    fentaNYL (SUBLIMAZE) injection   Intravenous PRN Jb Quiroz RN   50 mcg at 05/26/21 1419    ceFAZolin (ANCEF) injection   Intravenous PRN Jb Quiroz RN   2,000 mg at 05/26/21 1423    ePHEDrine injection   Intravenous PRN Jb Quiroz RN   10 mg at 05/26/21 1427    phenylephrine (RADHA-SYNEPHRINE) 1 MG/10ML prefilled syringe   Intravenous PRN Jb Quiroz RN   100 mcg at 05/26/21 1439    dexamethasone (DECADRON) injection   Intravenous PRN Jb Quiroz RN   10 mg at 05/26/21 1442    ondansetron (ZOFRAN) injection   Intravenous PRN Jb Quiroz RN   4 mg at 05/26/21 1516    0.9 % sodium chloride infusion   Intravenous Continuous PRN Jb Quiroz RN   New Bag at 05/26/21 1518        No Known Allergies    Review of Systems   Respiratory: Negative. Cardiovascular: Negative. Musculoskeletal:        Fractured femur     Neurological: Negative. All other systems reviewed and are negative.         PHYSICAL EXAM:   Vitals:    05/26/21 0830 05/26/21 1325 05/26/21 1330 05/26/21 1335   BP: (!) 126/52 107/61 (!) 121/59 (!) 123/55   Pulse: (!) 45 63 51 57   Resp: 16 16 14 16   Temp: 97.8 °F (36.6 °C)      TempSrc: Oral      SpO2: 94% 99% 100% 100%   Weight:       Height:          Constitutional: Well-developed, no acute distress  Eyes: conjunctivae normal   Ears, Nose, Throat: oral mucosa moist, no cyanosis   CV: no JVD. Rosa Nakayama. Normal S1S2 and no S3. No murmurs, rubs, or gallops. PMI is nondisplaced  Lungs: clear to auscultation bilaterally, normal respiratory effort without used of accessory muscles  Abdomen: soft,   Musculoskeletal: fractured femur   Skin: warm, no rashes, pale    I have personally reviewed the laboratory, cardiac diagnostic and radiographic testing as outlined below:    Data:    Recent Labs     05/24/21  1008 05/25/21  0531   WBC 4.0* 7.9   HGB 11.9* 11.9*   HCT 36.5* 36.3*   PLT 84* 102*     Recent Labs     05/24/21  1008 05/25/21  0531    137   K 4.4 4.0    100   CO2 25 30*   BUN 19 19   CREATININE 1.1 1.0   CALCIUM 8.8 8.6      No results found for: MG  Recent Labs     05/24/21  1008   TSH 2.720     No results for input(s): INR in the last 72 hours. CXR 05/24/12: Cardiomegaly with mild CHF    TTE: pending      EKG 5/24/21: Atrial flutter with HR of 42 bpm, RBBB, LAFB, QTc 449 msec. Telemetry 5/24/21: Atrial flutter with rates 40-55 bpm, no significant pauses  Telemetry 05/25/2021: atrial flutter with HRs 40s-60s bpm, no significant pause noted  Telemetry 05/25/2021: atrial flutter, HRs 45-58 bpm, no significant pause. Assessment/Plan:    1. Atrial flutter with slow ventricular response   - Unknown duration  - Asymptomatic   - [id number] 5 (Vasc, HTN, DM, Age), reports no OAC at home unclear if this was due to recurrent falls or bleeding.   - Unclear history of  rhythm control.  - Coreg 12.5 mg bid for rate control >> held 5/24/2021  - Avoid AV yimi blocking agents    2. Bradycardia   - AF with SVR  - Secondary to AV node blocking agents and underlying AV conduction disease.  - Doubt contributory to his recurrent falls per history but possibly related to his complaint of fatigue.  - HRs 40s-60s bpm off Coreg     3.  Bifascicular block  - RBBB and LAFB.  - No definite complete AV block seen thus far. 4. Coronary artery disease   - History of CABG in 1990's and PCI in 2012. - On ASA, Coreg and Zocor.   - Coreg held 5/24/2021    5. Hypertension   - On Accupril, Lasix and Coreg.  - Coreg held 5/24/2021    6. Hypothyroidism   - On Synthroid   - TSH 2.720 (5/24/21)    7. Diabetes milltus   - On Glipizide    Recommendations:    1. Continue to hold Coreg (AV yimi blocking therapy) and monitor heart rates. 2. Plan for outpatient cardiac monitor at discharge. Call our office to arrange. 3. Echocardiogram pending for evaluation of cardiac structure and LV function. 4. Reported not a candidate for [facility] due to frequent falls. If no true contraindication would recommend for stroke-risk reduction. 5. No indication for permanent pacemaker at this time. 6. Continue telemetry. 7. EP will follow peripherally. Please call with concerns. Thank you for allowing me to participate in your patient's care. YONNY Cid - NP - discussed with Dr Sariah Connor  [facility]  3:30 PM  5/26/2021     Attending Physician's Statement    Patient seen with the ANP. Agree with the findings, assessment and plan. Management plan was discussed. I have personally interviewed the patient, independently performed a focused cardiac examination, reviewed the pertinent laboratory and diagnostic testing with the patient and directly participated in the medical decision-making as noted above with the following additions:     Feeling well. He remains in AFL with HR 45-60 bpm. Underwent ORIF today. Physical exam showed no JVD, IRIR, no murmur clear lungs bilaterally, trace edema. Will continue to hold off Coreg. Consider start [facility] if no definite contraindications. Plan for outpatient cardiac monitor at discharge.     Jun Sparks MD  Cardiac Electrophysiology  [facility]

## 2021-05-26 NOTE — ANESTHESIA PRE PROCEDURE
Department of Anesthesiology  Preprocedure Note       Name:  April Jesse   Age:  79 y.o.  :  1940                                          MRN:  [id number]         Date:  2021      Surgeon: Artemio Bales):  Palak Mckinney DO    Procedure: Procedure(s): FEMUR OPEN REDUCTION INTERNAL FIXATION    Medications prior to admission:   Prior to Admission medications    Medication Sig Start Date End Date Taking?  Authorizing Provider   latanoprost (XALATAN) 0.005 % ophthalmic solution Place 1 drop into both eyes nightly   Yes Historical Provider, MD   potassium chloride (KLOR-CON M) 20 MEQ extended release tablet Take 20 mEq by mouth Daily with lunch   Yes Historical Provider, MD   ferrous sulfate (IRON 325) 325 (65 Fe) MG tablet Take 325 mg by mouth Daily with lunch   Yes Historical Provider, MD   levothyroxine (SYNTHROID) 112 MCG tablet Take 112 mcg by mouth nightly    Yes Historical Provider, MD   aspirin 81 MG tablet Take 81 mg by mouth daily   Yes Historical Provider, MD   glipiZIDE (GLUCOTROL) 10 MG tablet Take 10 mg by mouth 2 times daily (before meals)   Yes Historical Provider, MD   furosemide (LASIX) 20 MG tablet Take 20 mg by mouth daily    Yes Historical Provider, MD   simvastatin (ZOCOR) 40 MG tablet Take 40 mg by mouth daily    Yes Historical Provider, MD   quinapril (ACCUPRIL) 5 MG tablet Take 5 mg by mouth nightly   Yes Historical Provider, MD   folic acid (FOLVITE) 1 MG tablet Take 1 mg by mouth Daily with lunch    Yes Historical Provider, MD       Current medications:    Current Facility-Administered Medications   Medication Dose Route Frequency Provider Last Rate Last Admin    ceFAZolin (ANCEF) 2000 mg in sterile water 20 mL IV syringe  2,000 mg Intravenous Once Mounika Gonzlaes DO        latanoprost (XALATAN) 0.005 % ophthalmic solution 1 drop  1 drop Both Eyes Nightly Everardo Monsalve, APRN - CNP   1 drop at 21    aspirin EC tablet 81 mg  81 mg Oral Daily Mary Alice Yancey Nory Arevalo MD        ferrous sulfate (IRON 325) tablet 325 mg  325 mg Oral Lunch Shaila Cervantes MD   325 mg at 45/70/17 3236    folic acid (FOLVITE) tablet 1 mg  1 mg Oral Lunch Shaila Cervantes MD   1 mg at 05/25/21 1526    furosemide (LASIX) tablet 20 mg  20 mg Oral Daily Shaila Cervantes MD   20 mg at 05/26/21 1024    levothyroxine (SYNTHROID) tablet 112 mcg  112 mcg Oral Nightly Shaila Cervantes MD   112 mcg at 05/25/21 2044    lisinopril (PRINIVIL;ZESTRIL) tablet 2.5 mg  2.5 mg Oral Daily Shaila Cervantes MD   2.5 mg at 05/26/21 1024    atorvastatin (LIPITOR) tablet 10 mg  10 mg Oral Daily Shaila Cervantes MD   10 mg at 05/26/21 1024    sodium chloride flush 0.9 % injection 5-40 mL  5-40 mL Intravenous 2 times per day Shaila Cervantes MD   10 mL at 05/25/21 2100    sodium chloride flush 0.9 % injection 10 mL  10 mL Intravenous PRN Shaila Cervantes MD        0.9 % sodium chloride infusion  25 mL Intravenous PRN Shaila Cervantes MD        enoxaparin (LOVENOX) injection 40 mg  40 mg Subcutaneous Daily Shaila Cervantes MD        promethazine (PHENERGAN) tablet 12.5 mg  12.5 mg Oral Q6H PRN Shaila Cervantes MD        Or    [facility]) injection 4 mg  4 mg Intravenous Q6H PRN Shaila Cervantes MD        polyethylene glycol [facility]) packet 17 g  17 g Oral Daily PRN Shaila Cervantes MD   17 g at 05/25/21 1526    acetaminophen (TYLENOL) tablet 650 mg  650 mg Oral Q6H PRN Shaila eCrvantes MD        Or    acetaminophen (TYLENOL) suppository 650 mg  650 mg Rectal Q6H PRN Shaila Cervantes MD        oxyCODONE-acetaminophen (PERCOCET) 5-325 MG per tablet 1 tablet  1 tablet Oral Q4H PRN Shaila Cervantes MD   1 tablet at 05/25/21 1526    Or    oxyCODONE-acetaminophen (PERCOCET) 5-325 MG per tablet 2 tablet  2 tablet Oral Q4H PRN Shaila Cervantes MD        morphine (PF) injection 2 mg  2 mg Intravenous Q4H PRN Shaila Cervantes MD        perflutren lipid microspheres (DEFINITY) injection 1.65 mg  1.5 mL Intravenous ONCE PRN Alex Cast MD           Allergies:  No Known Allergies    Problem List:    Patient Active Problem List   Diagnosis Code    Left cataract H26.9    Closed fracture of right hip ([facility] S72.001A    Atrial flutter (HCC) I48.92    Bradycardia R00.1       Past Medical History:        Diagnosis Date    Diabetes mellitus ([facility]     History of blood transfusion         Hypertension     Thyroid disease        Past Surgical History:        Procedure Laterality Date    CARDIAC VALVE REPLACEMENT      CATARACT REMOVAL WITH IMPLANT Left 2017    CHOLECYSTECTOMY      OTHER SURGICAL HISTORY      quadruple bypass        Social History:    Social History     Tobacco Use    Smoking status: Former Smoker     Quit date:      Years since quittin.4    Smokeless tobacco: Never Used   Substance Use Topics    Alcohol use: Not on file                                Counseling given: Not Answered      Vital Signs (Current):   Vitals:    21 1445 21 1932 21 0031 21 0830   BP: 127/63 (!) 125/47 (!) 112/53 (!) 126/52   Pulse: 53 (!) 49 (!) 48 (!) 45   Resp: 16 18 16 16   Temp: 98.1 °F (36.7 °C) 98.3 °F (36.8 °C) 98.1 °F (36.7 °C) 97.8 °F (36.6 °C)   TempSrc: Temporal Oral Oral Oral   SpO2: 94% 95% 94% 94%   Weight:       Height:                                                  BP Readings from Last 3 Encounters:   21 (!) 126/52   17 133/81       NPO Status: time of last liquid consumption : 2300                        Time of last solid consumption: 1800                        Date of last liquid consumption: 21 (sip with meds)                        Date of last solid food consumption: 21    BMI:   Wt Readings from Last 3 Encounters:   21 179 lb (81.2 kg)   17 209 lb (94.8 kg)   17 205 lb (93 kg)     Body mass index is 26.43 kg/m².     CBC:   Lab Results Component Value Date    WBC 7.9 2021    RBC 3.47 2021    HGB 11.9 2021    HCT 36.3 2021    .6 2021    RDW 13.5 2021     2021       CMP:   Lab Results   Component Value Date     2021    K 4.0 2021     2021    CO2 30 2021    BUN 19 2021    CREATININE 1.0 2021    GFRAA >60 2021    LABGLOM >60 2021    GLUCOSE 178 2021    CALCIUM 8.6 2021       POC Tests: No results for input(s): POCGLU, POCNA, POCK, POCCL, POCBUN, POCHEMO, POCHCT in the last 72 hours. Coags: No results found for: PROTIME, INR, APTT    HCG (If Applicable): No results found for: PREGTESTUR, PREGSERUM, HCG, HCGQUANT     ABGs: No results found for: PHART, PO2ART, MLI0SFA, FDF3TOL, BEART, T8MRIEXA     Type & Screen (If Applicable):  No results found for: LABABO, LABRH    Drug/Infectious Status (If Applicable):  No results found for: HIV, HEPCAB    COVID-19 Screening (If Applicable):   Lab Results   Component Value Date    COVID19 Not Detected 2021       Ek2021  Ventricular Rate BPM 42    Atrial Rate     QRS Duration ms 140    Q-T Interval ms 538    QTc Calculation (Bazett) ms 449    P Axis degrees 34    R Axis degrees -72    T Axis degrees -94    [facility]      Narrative & Impression    Atrial flutter with variable AV block  Right bundle branch block  Left anterior fascicular block   Bifascicular block  T wave abnormality, consider inferolateral ischemia  Abnormal ECG  No previous ECGs available  Confirmed by Anastasiia Simental ([id number]) on 2021 12:03:39 PM       CXR: 2021  Impression   1. Cardiomegaly with findings of mild CHF.          Anesthesia Evaluation  Patient summary reviewed and Nursing notes reviewed no history of anesthetic complications:   Airway: Mallampati: II  TM distance: >3 FB   Neck ROM: limited  Mouth opening: > = 3 FB Dental:    (+) edentulous      Pulmonary:

## 2021-05-26 NOTE — CARE COORDINATION
Transition of Care-Discharge plan is [facility] when medically stable for discharge. Patient was accepted, will require a pre-cert as well as covid testing prior to discharge. Patient is going to OR today for right femur open reduction internal fixation with Dr. Richard Becerra , add on case for this afternoon. HENS, Ambulance and envelope in soft chart.      Traci Tuttle BSN, RN  JUDY   [phone]

## 2021-05-27 LAB
ANION GAP SERPL CALCULATED.3IONS-SCNC: 8 MMOL/L (ref 7–16)
BUN BLDV-MCNC: 25 MG/DL (ref 6–23)
CALCIUM SERPL-MCNC: 8.5 MG/DL (ref 8.6–10.2)
CHLORIDE BLD-SCNC: 100 MMOL/L (ref 98–107)
CO2: 28 MMOL/L (ref 22–29)
CREAT SERPL-MCNC: 0.9 MG/DL (ref 0.7–1.2)
GFR AFRICAN AMERICAN: >60
GFR NON-AFRICAN AMERICAN: >60 ML/MIN/1.73
GLUCOSE BLD-MCNC: 269 MG/DL (ref 74–99)
HCT VFR BLD CALC: 32.6 % (ref 37–54)
HEMOGLOBIN: 10.6 G/DL (ref 12.5–16.5)
POTASSIUM SERPL-SCNC: 4.8 MMOL/L (ref 3.5–5)
SODIUM BLD-SCNC: 136 MMOL/L (ref 132–146)

## 2021-05-27 PROCEDURE — 97161 PT EVAL LOW COMPLEX 20 MIN: CPT

## 2021-05-27 PROCEDURE — 2580000003 HC RX 258: Performed by: STUDENT IN AN ORGANIZED HEALTH CARE EDUCATION/TRAINING PROGRAM

## 2021-05-27 PROCEDURE — 2500000003 HC RX 250 WO HCPCS: Performed by: STUDENT IN AN ORGANIZED HEALTH CARE EDUCATION/TRAINING PROGRAM

## 2021-05-27 PROCEDURE — 80048 BASIC METABOLIC PNL TOTAL CA: CPT

## 2021-05-27 PROCEDURE — 6370000000 HC RX 637 (ALT 250 FOR IP): Performed by: STUDENT IN AN ORGANIZED HEALTH CARE EDUCATION/TRAINING PROGRAM

## 2021-05-27 PROCEDURE — 85014 HEMATOCRIT: CPT

## 2021-05-27 PROCEDURE — 36415 COLL VENOUS BLD VENIPUNCTURE: CPT

## 2021-05-27 PROCEDURE — 97530 THERAPEUTIC ACTIVITIES: CPT

## 2021-05-27 PROCEDURE — 6360000002 HC RX W HCPCS: Performed by: INTERNAL MEDICINE

## 2021-05-27 PROCEDURE — 85018 HEMOGLOBIN: CPT

## 2021-05-27 PROCEDURE — 99232 SBSQ HOSP IP/OBS MODERATE 35: CPT | Performed by: INTERNAL MEDICINE

## 2021-05-27 PROCEDURE — 2060000000 HC ICU INTERMEDIATE R&B

## 2021-05-27 PROCEDURE — 97165 OT EVAL LOW COMPLEX 30 MIN: CPT

## 2021-05-27 PROCEDURE — 6360000002 HC RX W HCPCS: Performed by: STUDENT IN AN ORGANIZED HEALTH CARE EDUCATION/TRAINING PROGRAM

## 2021-05-27 PROCEDURE — 97535 SELF CARE MNGMENT TRAINING: CPT

## 2021-05-27 RX ADMIN — FERROUS SULFATE TAB 325 MG (65 MG ELEMENTAL FE) 325 MG: 325 (65 FE) TAB at 15:09

## 2021-05-27 RX ADMIN — LISINOPRIL 2.5 MG: 5 TABLET ORAL at 11:14

## 2021-05-27 RX ADMIN — FOLIC ACID 1 MG: 1 TABLET ORAL at 15:09

## 2021-05-27 RX ADMIN — FUROSEMIDE 20 MG: 20 TABLET ORAL at 11:14

## 2021-05-27 RX ADMIN — OXYCODONE AND ACETAMINOPHEN 1 TABLET: 5; 325 TABLET ORAL at 20:14

## 2021-05-27 RX ADMIN — LEVOTHYROXINE SODIUM 112 MCG: 0.11 TABLET ORAL at 20:15

## 2021-05-27 RX ADMIN — Medication 10 ML: at 20:07

## 2021-05-27 RX ADMIN — ENOXAPARIN SODIUM 40 MG: 40 INJECTION SUBCUTANEOUS at 11:18

## 2021-05-27 RX ADMIN — CEFAZOLIN 2000 MG: 10 INJECTION, POWDER, FOR SOLUTION INTRAVENOUS at 04:13

## 2021-05-27 RX ADMIN — ATORVASTATIN CALCIUM 10 MG: 10 TABLET, FILM COATED ORAL at 11:15

## 2021-05-27 RX ADMIN — SODIUM CHLORIDE, PRESERVATIVE FREE 10 ML: 5 INJECTION INTRAVENOUS at 04:13

## 2021-05-27 ASSESSMENT — PAIN SCALES - GENERAL: PAINLEVEL_OUTOF10: 6

## 2021-05-27 ASSESSMENT — PAIN DESCRIPTION - PAIN TYPE: TYPE: SURGICAL PAIN

## 2021-05-27 ASSESSMENT — PAIN DESCRIPTION - LOCATION: LOCATION: HIP

## 2021-05-27 NOTE — PROGRESS NOTES
[address]      Date:2021                                                  Patient Name: Leigh Karimi  MRN: [id number]  : 1940  Room: [address] [place] [address]    Evaluating OT: TERESA Ge, OTR/L  # [id number]    Referring Provider:  Jerad Guillen DO  Specific Provider Orders:  \"OT Eval and Treat\"      Diagnosis:   1. Closed fracture of right hip, initial encounter ([facility]    2. Fall, initial encounter    3. General weakness    4. Acute on chronic combined systolic and diastolic CHF (congestive heart failure) ([facility]    5. Atrial flutter, unspecified type ([facility]    6. Post-op pain         Surgeries this admission: 21: ORIF Right Femur     Pertinent Medical History: DM, HTN,  CABG x 4      Precautions:  Fall Risk  WBAT R LE  General Diet/No Added salt    Assessment of current deficits   [x] Functional mobility   [x]ADLs  [x] Strength               [x]Cognition   [x] Functional transfers   [x] IADLs         [x] Safety Awareness   [x]Endurance   [] Fine Coordination              [x] Balance      [] Vision/perception   [x]Sensation    []Gross Motor Coordination  [] ROM  [] Delirium                   [] Motor Control       OT PLAN OF CARE   OT POC based on physician orders, patient diagnosis and results of clinical assessment    Frequency/Duration 1-3 days/wk for 2 weeks PRN   Specific OT Treatment to include:    Instruction/training on adapted ADL techniques and AE recommendations to increase functional independence within precautions  Training on energy conservation strategies, correct breathing pattern and techniques to improve independence/tolerance for self-care routine  Functional transfer/mobility training/DME recommendations for increased independence, safety, and fall prevention  Patient/Family education to increase follow through with safety techniques and functional independence  Recommendation of environmental modifications for increased safety with functional transfers/mobility and ADLs  Cognitive retraining/development of therapeutic activities to improve problem solving, judgement, memory, and attention for increased safety/participation in ADL/IADL tasks  Sensory re-education to improve body/limb awareness, maintain/improve skin integrity, and improve hand/UE motor function  Splinting/positioning for increased function, prevention of contractures, and improve skin integrity  Therapeutic exercise to improve motor endurance, ROM, and functional strength for ADLs/functional transfers  Therapeutic activities to facilitate/challenge dynamic balance, stand tolerance for increased safety and independence with ADLs  Therapeutic activities to facilitate gross/fine motor skills for increased independence with ADLs  Neuro-muscular re-education: facilitation of righting/equilibrium reactions, midline orientation, scapular stability/mobility, normalization of muscle tone, and facilitation of volitional active controled movement  Positioning to improve skin integrity, interaction with environment and functional independence  Delirium prevention/treatment    Pt was admitted after falling at home d/t general weakness. Found in A Fib, w/ Bradycardia    Recommended Adaptive Equipment: TBD as pt progresses - Consider BSC, Adaptive equipment    Pt is a [facility] [place] [facility] - no family present at b/s, no information on chart about home set up or PLOF    Home Living:  Pt lives with his wife in a 1-story house. Bed/bath on the main floor. No Basement.    Bathroom setup:  Tub-Shower w/ Shower doors, [address] [place] [address] [employer]   Equipment owned:  [employer], Shower chair    Available Family Assist:  Family available PRN but work during the day - cannot provide 24/7 assist.  Per pt, his wife is not safe to provide physical assistance    Prior Level of Function:  Pt reported being IND with all ADLs, Light IADLs, Transfers and Mobility using No AD for ambulation. Driving:  ?? Occupation:  ?? Pain Level:  3/10 R LE prior to ax, increased to 4/10 -FACES Scale;  Relief w/ Rest and Repositioning, Nsg Notified   Additional Complaints:  Mild Lightheadedness after initial stance    Cognition: A & O x 3 - generally oriented   Able to Follow Multi-Step Commands w/ Min-Mod VCs   Memory:  fair    Sequencing:  fair    Problem solving:  fair    Judgement/safety:  fair   Additional Comments:  Pt was pleasant and cooperative. Provided inconsistent report of home set up and PLOF    Vitals/Lab Values:  [facility] Room Air HR ~ , O2 sats remained > 90% w/ ax       Functional Assessment:  AM-PAC Daily Activity Raw Score: 14/24   Initial Eval Status  Date: 5/27/21   Treatment Status  Date: STGs = LTGs  Time frame: 10-14 days   Feeding Set up    Able to feed self    NA   Grooming Min A    Seated EOB, Min VCs  Unable to tolerate ambulating to or standing at sink    SUP/Set up  Seated/Standing at the [employer] A    Min A/Mod VCs after set up seated EOB (simulated)    SUP/Set up     LB Dressing Max A    Max A to don socks seated EOB  Max A for simulated donning of pants + Mod A for dynamic standing balance  VCs for safety/adaptive techs    Mod A     Bathing NT    Pt ed re: Benefits of use of Shower chair/Tub Bench, however, pt has Shower doors    Mod A      Toileting NT      Mod A     Bed Mobility  Supine to sit: Mod A   Sit to supine:   Mod A     VCs for safety/hand placement    Supine to sit: Min A  Sit to supine: Min A     Functional Transfers Mod A    Mod A + Mod VCs for safety/hand placement to stand from EOB, mild dizziness after initial stance    Min A     Functional Mobility Mod A/Mod VCs w/ [employer]    Required Mod VC to advance feet, manage walker b/t surfaces only     Min A     Balance Sitting:     Static:  SUP EOB    Dynamic:  Close SUP w/ functional ax EOB    Standing:     Static:  Mod A w/ [employer]    Dynamic:  Mod A w/ functional ax/mobility w/ [facility]       Activity Tolerance Fair    Tolerated sitting EOB > 10 mins w/ functional ax  Tolerated standing ~ 5 mins    Good(-)   Visual/  Perceptual    Hearing: WNL   Glasses: Yes    WFL  Hearing Aids:                 Hand Dominance: Right   AROM (PROM) Strength Additional Info:    RUE  WFL 5-/5 Good(-)  and FMC/dexterity noted during ADL tasks       LUE WFL 5-/5 Good(-)  and FMC/dexterity noted during ADL tasks       Sensation:  Denies numbness or tingling Scar UEs   Tone: WFL Scar UEs   Edema: None Noted Scar UEs     Comments: Upon arrival, patient was found in supine. He was agreeable to participate in therapeutic ax. No Family present during session. Received permission from RN prior to engaging pt in OT services. At the end of the session, patient was properly positioned in b/s chair. Call light and phone within reach, all lines and tubes intact. Oriented pt to call bell. Made all appropriate Environmental Modifications to facilitate pt's level of IND and safety. All needs met. Overall patient demonstrated decreased independence and safety during completion of ADL/functional transfer/mobility tasks. Pt would benefit from continued skilled OT to increase safety and independence with completion of ADL/IADL tasks for functional independence and quality of life.     Treatment: OT treatment provided this date includes:    Instruction/training on safety and adapted techniques for completion of ADLs, use of DME/AD/Adaptive equip:     Instruction/training on safe functional mobility/transfer techniques, use of DME/AD:     Instruction/training on energy conservation techs (EC)/Pursed-Lip Breathing (PLB)/work simplification for completion of ADLs:      Neuromuscular Reeducation to facilitate balance/righting reactions for increased function with ADLs:     Skilled positioning/alignment to maximize Pt's ability to [facility] interact w/ his/her environment, maximize respiratory status   Activity tolerance - Sitting/Standing to improve endurance w/ functional ax    Cognitive retraining -  Oriented pt to current Date, Place and Situation; Cues for safety/safety awareness, sequencing, problem solving     Skilled monitoring of Vitals during session and pt's response to tx ax - HR, O2 sats         Consulted RN, PT, SW, CM     Made all appropriate Environmental Modifications to facilitate pt's level of IND and safety.  Recommendations for Continued Participation in OT services during Hospitalization and at D/C - SNF    Pt and/or Family verbalized/demonstrated a Fair understanding of education provided. Will Review PRN. Rehab Potential: Good(-) for established goals     Patient / Family Goal: Not stated at this time      Patient and/or family were instructed on functional diagnosis, prognosis/goals and OT plan of care. Demonstrated Fair understanding. Eval Complexity: Low    Time In: 1309  Time Out: 1343  Total Treatment Time: 10 minutes    Min Units   OT Eval Low 97165  X  1   OT Eval Medium 18141      OT Eval High 24328      OT Re-Eval [id number]       Therapeutic Ex 69960       Therapeutic Activities 67078       ADL/Self Care 82192  10  1   Orthotic Management 94694       Manual 90212     Neuro Re-Ed 91940       Non-Billable Time              Evaluation Time additionally includes thorough review of current medical information, gathering information on past medical history/social history and prior level of function, completion of standardized testing/informal observation of tasks, assessment of data and education on plan of care and goals.             Preet Krueger, TERESA, OTR/L  # [id number]

## 2021-05-27 NOTE — PROGRESS NOTES
Subjective: The patient is awake and alert. No acute events overnight. Denies chest pain, angina, SOB   Pain is well controlled  No acute complaints resting comfortably sitting up in chair    Objective:    /61   Pulse (!) 47   Temp 97.9 °F (36.6 °C) (Oral)   Resp 16   Ht 5' 9\" (1.753 m)   Wt 179 lb (81.2 kg)   SpO2 97% Comment: 91% on room air. BMI 26.43 kg/m²     In: 700 [I.V.:700]  Out: 625   In: 700   Out: 625 [Urine:600]    General appearance: NAD, conversant  HEENT: AT/NC, MMM  Neck: FROM, supple  Lungs: Clear to auscultation  CV: RRR, no MRGs  Vasc: Radial pulses 2+  Abdomen: Soft, non-tender; no masses or HSM  Extremities: No peripheral edema or digital cyanosis, right leg weak with surgical dressing  Skin: no rash, lesions or ulcers  Psych: Alert and oriented to person, place and time  Neuro: Alert and interactive     Recent Labs     05/25/21  0531 05/27/21  0749   WBC 7.9  --    HGB 11.9* 10.6*   HCT 36.3* 32.6*   *  --        Recent Labs     05/25/21  0531 05/27/21  0749    136   K 4.0 4.8    100   CO2 30* 28   BUN 19 25*   CREATININE 1.0 0.9   CALCIUM 8.6 8.5*       Assessment:    Principal Problem:    Closed fracture of right hip (HCC)  Active Problems:    Atrial flutter (HCC)    Bradycardia  Resolved Problems:    * No resolved hospital problems. *      Plan:  79year old male admitted with mechanical fall at home  Closed right fracture of proximal femur  - Pain management   - Hold anticoagulants until decision on surgical procedure  - Patient is POD ORIF 1 doing well    - Orthopedic surgery following procedure today     Diabetes Mellitus  - Insulin sliding scale for glucose control  - hypoglycemia protocol     Atrial flutter  -  81 mg as a- hold prior to  surgery    - 2017 stent placement  2001 CABG X4  - Unable to obtain any cardiology records.    - Check TSH     CHF  - Diurese cautiously so as to avoid renal's insufficiency Lasix 40 mg IV - completed  - Daily weights strict I's and O's  - BNP on 05/26/2021  - Discontinue coreg due to symptomatic bradycardia     DVT Prophylaxis - PCD's   PT/OT  Discharge planning - likely [facility] - pre cert pending    Maximino Burkitt, APRN - CNP  2:45 PM  5/27/2021     Patient is sitting up in chair no apparent acute distress  Continue to monitor, he is remains off beta-blocker but heart rate is still in the 50s with blood pressure borderline 90s to 100  Postop anemia to be expected  Okay for transfer to subacute rehab once pre-CERT obtained      I personally saw, examined and provided care for the patient. Radiographs, labs and medication list were reviewed by me independently. The case was discussed in detail and plans for care were established. Review of [address] [place] [address], documentation was conducted and revisions were made as appropriate directly by me. I agree with the above documented exam, problem list, and plan of care.      Katie Jain MD  5:22 PM  5/27/2021

## 2021-05-27 NOTE — PROGRESS NOTES
Department of Orthopedic Surgery  Resident Progress Note    Patient seen and examined. Pain controlled. No new complaints. Denies chest pain, shortness of breath, dizziness/lightheadedness. Denies numbness, tingling, paraesthesias.      VITALS:  BP (!) 101/57   Pulse 60   Temp 97.8 °F (36.6 °C) (Oral)   Resp 16   Ht 5' 9\" (1.753 m)   Wt 179 lb (81.2 kg)   SpO2 100%   BMI 26.43 kg/m²     General: alert and oriented    MUSCULOSKELETAL:   right lower extremity:  · Dressing C/D/I  · Compartments soft and compressible  · +PF/DF/EHL  · +2/4 DP & PT pulses, Brisk Cap refill, Toes warm and perfused  · Distal sensation grossly intact to Peroneals, Sural, Saphenous, and tibial nrs    CBC:   Lab Results   Component Value Date    WBC 7.9 05/25/2021    HGB 11.9 05/25/2021    HCT 36.3 05/25/2021     05/25/2021     PT/INR:  No results found for: PROTIME, INR    ASSESSMENT  · S/P R hip CMN 5/26    PLAN    · WBAT RLE  · Deep venous thrombosis prophylaxis - lovenox inpatient transition to ASA outpatient, PCD's, early mobilization  · 24 hr abx  · PT/OT  · Pain Control: IV and PO  · Monitor H&H: awaiting am labs  · D/W Dr. Idalia Taylor

## 2021-05-27 NOTE — PROGRESS NOTES
Inpatient Cardiology Progress Note     PATIENT IS BEING FOLLOWED FOR: Cardiac risk stratification prior to right femur ORIF    Dagoberto Weaver is a 79 y.o. male who is known to me through inpatient consultation this admission     SUBJECTIVE: denies CP or SOB orc lightheadedness  OBJECTIVE: No apparent distress     ROS:  Consist: Denies fevers, chills or night sweats  Heart: Denies chest pain, palpitations, lightheadedness, dizziness or syncope  Lungs: Denies SOB, cough, wheezing, orthopnea or PND  GI: Denies abdominal pain, vomiting or diarrhea    PHYSICAL EXAM:   BP (!) 105/52   Pulse 55   Temp 97.9 °F (36.6 °C) (Oral)   Resp 16   Ht 5' 9\" (1.753 m)   Wt 179 lb (81.2 kg)   SpO2 97% Comment: 91% on room air. BMI 26.43 kg/m²    B/P Range last 24 hours: Systolic (45HFM), CJT:153 , Min:54 , FUR:167    Diastolic (14RKM), KWK:71, Min:33, Max:90    CONST: Well developed, well nourished elderly white male who appears stated age. Awake, alert and cooperative. No apparent distress  HEENT:   Head- Normocephalic, atraumatic   Eyes- Conjunctivae pink, anicteric  Neck-  No stridor, trachea midline, no jugular venous distention. No carotid bruit  CHEST: Chest symmetrical and non-tender to palpation. No accessory muscle use or intercostal retractions  RESPIRATORY:  Lung sounds - clear throughout fields   CARDIOVASCULAR:     Heart Inspection- shows no noted pulsations  Heart Palpation- no heaves or thrills; PMI is non-displaced   Heart Ausculation- Irregular rhythm with slow rate, 8-0/8 systolic murmur. PV: No lower extremity edema. No varicosities. Pedal pulses palpable, no clubbing or cyanosis   ABDOMEN: Soft, non-tender to light palpation. Bowel sounds present. No palpable masses no organomegaly; no abdominal bruit  MS: Good muscle strength and tone. No atrophy or abnormal movements. : Deferred  SKIN: Warm and dry no statis dermatitis or ulcers   NEURO / PSYCH: Oriented to person, place and time.  Speech clear and appropriate. Follows all commands. Pleasant affect       Intake/Output Summary (Last 24 hours) at 5/27/2021 1011  Last data filed at 5/27/2021 0412  Gross per 24 hour   Intake 700 ml   Output 825 ml   Net -125 ml       Weight:   Wt Readings from Last 3 Encounters:   05/24/21 179 lb (81.2 kg)   09/12/17 209 lb (94.8 kg)   09/08/17 205 lb (93 kg)     Current Inpatient Medications:   latanoprost  1 drop Both Eyes Nightly    ferrous sulfate  325 mg Oral Lunch    folic acid  1 mg Oral Lunch    furosemide  20 mg Oral Daily    levothyroxine  112 mcg Oral Nightly    lisinopril  2.5 mg Oral Daily    atorvastatin  10 mg Oral Daily    sodium chloride flush  5-40 mL Intravenous 2 times per day    enoxaparin  40 mg Subcutaneous Daily       IV Infusions (if any):   sodium chloride         DIAGNOSTIC/ LABORATORY DATA:  Labs:   CBC:   Recent Labs     05/25/21  0531 05/27/21  0749   WBC 7.9  --    HGB 11.9* 10.6*   HCT 36.3* 32.6*   *  --      BMP:   Recent Labs     05/25/21  0531 05/27/21  0749    136   K 4.0 4.8   CO2 30* 28   BUN 19 25*   CREATININE 1.0 0.9   LABGLOM >60 >60   CALCIUM 8.6 8.5*                                                                                                                                                                                                                                                                                                                                                                                                                                                                                                                                                                                                                         TFT:   Lab Results   Component Value Date    TSH 2.720 05/24/2021    T4FREE 1.65 05/24/2021        CXR 5/24/2021:  Impression  1.  Cardiomegaly with findings of mild CHF.     Bilateral Hip XRAY 5/24/2021:  Impression  Mildly displaced right femoral fracture.     Right Femur XRAY 5/24/2021:  Impression  1. Comminuted intratrochanteric fracture of the right hip  2. There is no fracture of the distal femur.       Telemetry: atrial flutter with very slow ventricular response    Echo: Pending      ASSESSMENT:   1. Right hip fracture s/p fall s/p right femur ORIF5/26/21 without any perioperative cardiac complications  2. CAD with history of CABG in the 1990s and stents around 2012, specifics unknown. Denies chest pain. Has history of CHF, but is not in CHF on exam. Questionable old inferior and anterior MI on EKG. Has not seen a cardiologist in a long time. 3. Atrial flutter with slow ventricular response, date of onset unclear. EP following. Deemed high risk for oral anticoagulation due to recurrent falls (ambulates with walker). 4. 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 5. History of hypertension. 6. Hyperlipidemia. 7. Type II diabetes mellitus. 8. Ex smoker. 9. Hypothyroidism, on replacement therapy. 10. Thrombocytopenia. 11. Acute ( mild ) blood loss  12. Cardiac murmur on exam.            PLAN:  1. No additional recommendations from cardiology stand point  2. Rhythm / rate management +/- OAC as per EP  3. Will review Echo when done  4. Rest as per the primary service and other consultants  5. May be discharged from cardiology stand point when OK with others  6. Cardiology will sign off.  Please call if needed            Electronically signed by Arva Boast, MD on 5/27/2021 at 10:11 AM

## 2021-05-27 NOTE — PROGRESS NOTES
Standing Count: 1 Occurrences, R      Will B Beucler, DO       Diagnosis:  Closed fracture proximal femur, transepiphyseal, right, [facility] [S72.301A]  Specific instructions for next treatment:  Progress ambulation as tolerated     Current Treatment Recommendations:     [x] Strengthening to improve independence with functional mobility   [x] ROM to improve independence with functional mobility   [x] Balance Training to improve static/dynamic balance and to reduce fall risk  [x] Endurance Training to improve activity tolerance during functional mobility   [x] Transfer Training to improve safety and independence with all functional transfers   [x] Gait Training to improve gait mechanics, endurance and asses need for appropriate assistive device  [x] Stair Training in preparation for safe discharge home and/or into the community   [x] Positioning to prevent skin breakdown and contractures  [x] Safety and Education Training   [x] Patient/Caregiver Education   [x] HEP  [] Other     PT long term treatment goals are located in above grid    Frequency of treatments: 2-5x/week x 1-2 weeks. Time in  1305  Time out  1344    Total Treatment Time  25 minutes     Evaluation Time includes thorough review of current medical information, gathering information on past medical history/social history and prior level of function, completion of standardized testing/informal observation of tasks, assessment of data and education on plan of care and goals.     CPT codes:  [x] Low Complexity PT evaluation 90126  [] Moderate Complexity PT evaluation 40234  [] High Complexity PT evaluation 97937  [] PT Re-evaluation 71436  [] Gait training 15357 -- minutes  [] Manual therapy [address] [facility] -- minutes  [x] Therapeutic activities 01891 25 minutes  [] Therapeutic exercises 62153 -- minutes  [] Neuromuscular reeducation 22697 -- minutes     Rosalba nOeill, PT, DPT  [id number]

## 2021-05-28 VITALS
TEMPERATURE: 97.9 F | WEIGHT: 179 LBS | OXYGEN SATURATION: 97 % | RESPIRATION RATE: 16 BRPM | HEART RATE: 52 BPM | BODY MASS INDEX: 26.51 KG/M2 | HEIGHT: 69 IN | SYSTOLIC BLOOD PRESSURE: 120 MMHG | DIASTOLIC BLOOD PRESSURE: 41 MMHG

## 2021-05-28 LAB
ANION GAP SERPL CALCULATED.3IONS-SCNC: 7 MMOL/L (ref 7–16)
BUN BLDV-MCNC: 38 MG/DL (ref 6–23)
CALCIUM SERPL-MCNC: 8.4 MG/DL (ref 8.6–10.2)
CHLORIDE BLD-SCNC: 99 MMOL/L (ref 98–107)
CO2: 29 MMOL/L (ref 22–29)
CREAT SERPL-MCNC: 1.1 MG/DL (ref 0.7–1.2)
GFR AFRICAN AMERICAN: >60
GFR NON-AFRICAN AMERICAN: >60 ML/MIN/1.73
GLUCOSE BLD-MCNC: 156 MG/DL (ref 74–99)
HCT VFR BLD CALC: 29.7 % (ref 37–54)
HEMOGLOBIN: 9.5 G/DL (ref 12.5–16.5)
LV EF: 48 %
LVEF MODALITY: NORMAL
POTASSIUM SERPL-SCNC: 4.2 MMOL/L (ref 3.5–5)
SODIUM BLD-SCNC: 135 MMOL/L (ref 132–146)

## 2021-05-28 PROCEDURE — 80048 BASIC METABOLIC PNL TOTAL CA: CPT

## 2021-05-28 PROCEDURE — 85018 HEMOGLOBIN: CPT

## 2021-05-28 PROCEDURE — 85014 HEMATOCRIT: CPT

## 2021-05-28 PROCEDURE — 2700000000 HC OXYGEN THERAPY PER DAY

## 2021-05-28 PROCEDURE — 2580000003 HC RX 258: Performed by: STUDENT IN AN ORGANIZED HEALTH CARE EDUCATION/TRAINING PROGRAM

## 2021-05-28 PROCEDURE — 51798 US URINE CAPACITY MEASURE: CPT

## 2021-05-28 PROCEDURE — 36415 COLL VENOUS BLD VENIPUNCTURE: CPT

## 2021-05-28 PROCEDURE — 93306 TTE W/DOPPLER COMPLETE: CPT

## 2021-05-28 PROCEDURE — 6360000002 HC RX W HCPCS: Performed by: INTERNAL MEDICINE

## 2021-05-28 PROCEDURE — 6370000000 HC RX 637 (ALT 250 FOR IP): Performed by: STUDENT IN AN ORGANIZED HEALTH CARE EDUCATION/TRAINING PROGRAM

## 2021-05-28 RX ADMIN — ATORVASTATIN CALCIUM 10 MG: 10 TABLET, FILM COATED ORAL at 11:38

## 2021-05-28 RX ADMIN — FERROUS SULFATE TAB 325 MG (65 MG ELEMENTAL FE) 325 MG: 325 (65 FE) TAB at 13:09

## 2021-05-28 RX ADMIN — ENOXAPARIN SODIUM 40 MG: 40 INJECTION SUBCUTANEOUS at 11:39

## 2021-05-28 RX ADMIN — FOLIC ACID 1 MG: 1 TABLET ORAL at 13:09

## 2021-05-28 RX ADMIN — Medication 10 ML: at 11:39

## 2021-05-28 ASSESSMENT — PAIN SCALES - GENERAL: PAINLEVEL_OUTOF10: 0

## 2021-05-28 NOTE — DISCHARGE INSTR - COC
Continuity of Care Form    Patient Name: Shahida Aponte   :  1940  MRN:  [id number]    Admit date:  2021  Discharge date:  21    Code Status Order: Full Code   Advance Directives:   [facility] Documentation       Date/Time Healthcare Directive Type of Healthcare Directive Copy in [facility] [address] Agent's Name Healthcare Agent's Phone Number    21 0010  No, patient does not have an advance directive for healthcare treatment -- -- -- -- --    21 1800  No, patient does not have an advance directive for healthcare treatment -- -- -- -- --            Admitting Physician:  Amanda Silva MD  PCP: Miguel Tierney MD    Discharging Nurse:   [facility] [address] Unit/Room#: 8723/2836-R  Discharging Unit Phone Number: [phone]    Emergency Contact:   Extended Emergency Contact Information  Primary Emergency Contact: Ami Rust  [address]   Home Phone: [phone]  Relation: Spouse  Secondary Emergency Contact: Samia Moreira  Mobile Phone: [phone]  Relation: Child  Preferred language: English   needed? No    Past Surgical History:  Past Surgical History:   Procedure Laterality Date    CARDIAC VALVE REPLACEMENT      CATARACT REMOVAL WITH IMPLANT Left 2017    CHOLECYSTECTOMY      FEMUR FRACTURE SURGERY Right 2021    FEMUR OPEN REDUCTION INTERNAL FIXATION performed by Lenora Rueda DO at [address] [place] [address]    quadruple bypass        Immunization History: There is no immunization history on file for this patient.     Active Problems:  Patient Active Problem List   Diagnosis Code    Left cataract H26.9    Closed fracture of right hip ([facility]) S72.001A    Atrial flutter ([facility]) I48.92    Bradycardia R00.1       Isolation/Infection:   Isolation            No Isolation          Patient Infection Status       Infection Onset Added Last Indicated Last Indicated By Review Planned Expiration Resolved Resolved By    None active    Resolved    COVID-19 Rule Out 05/24/21 05/24/21 05/24/21 COVID-19, Rapid (Ordered)   05/24/21 Rule-Out Test Resulted            Nurse Assessment:  Last Vital Signs: BP (!) 99/53   Pulse (!) 47   Temp 97.9 °F (36.6 °C) (Oral)   Resp 16   Ht 5' 9\" (1.753 m)   Wt 179 lb (81.2 kg)   SpO2 100%   BMI 26.43 kg/m²     Last documented pain score (0-10 scale): Pain Level: 0  Last Weight:   Wt Readings from Last 1 Encounters:   05/24/21 179 lb (81.2 kg)     Mental Status:  oriented and alert    IV Access:  - None    Nursing Mobility/ADLs:  Walking   Assisted  Transfer  Assisted  Bathing  Assisted  Dressing  Assisted  Toileting  Assisted  Feeding  Independent  Med Admin  Assisted  Med Delivery   whole    Wound Care Documentation and Therapy:  Wound 05/24/21 Heel Right (Active)   Wound Etiology Pressure Unstageable 05/28/21 0005   Dressing Status Dry; Intact 05/28/21 0005   Wound Length (cm) 1 cm 05/24/21 1800   Wound Width (cm) 1 cm 05/24/21 1800   Wound Depth (cm) 0 cm 05/24/21 1800   Wound Surface Area (cm^2) 1 cm^2 05/24/21 1800   Wound Volume (cm^3) 0 cm^3 05/24/21 1800   Wound Assessment Purple/maroon 05/28/21 0005   Drainage Amount None 05/28/21 0005   Odor None 05/28/21 0005   Number of days: 3        Elimination:  Continence:   · Bowel: Yes  · Bladder: Yes  Urinary Catheter: Insertion Date: 05/27/21  FAILED VOIDING TRIAL  Colostomy/Ileostomy/Ileal Conduit: No       Date of Last BM:     Intake/Output Summary (Last 24 hours) at 5/28/2021 1110  Last data filed at 5/28/2021 0557  Gross per 24 hour   Intake 480 ml   Output 850 ml   Net -370 ml     I/O last 3 completed shifts:   In: 480 [P.O.:480]  Out: 1000 [Urine:1000]    Safety Concerns:     History of Falls (last 30 days)    Impairments/Disabilities:      None    Nutrition Therapy:  Current Nutrition Therapy:   GENERAL DIET-NO ADDED SALT    Routes of Feeding: Oral  Liquids: No Restrictions  Daily Fluid Restriction: no  Last Modified Barium Swallow with Video (Video Swallowing Test): not done    Treatments at the Time of Hospital Discharge:   Respiratory Treatments:   Oxygen Therapy:  is on oxygen at 1 L/min per nasal cannula. Ventilator:    - No ventilator support    Rehab Therapies: Physical Therapy and Occupational Therapy  Weight Bearing Status/Restrictions: No weight bearing restirctions WBAT as tolerated  Other Medical Equipment (for information only, NOT a DME order): Other Treatments:     Patient's personal belongings (please select all that are sent with patient):  Dentures upper and lower    RN SIGNATURE:  Electronically signed by Janina Hodge RN on 5/28/21 at 1:47 PM EDT    CASE MANAGEMENT/SOCIAL WORK SECTION    Inpatient Status Date: 5/24/21    Readmission Risk Assessment Score:  Readmission Risk              Risk of Unplanned Readmission:  15           Discharging to Facility/ Agency   · Name:   · Address:  · Phone:  · Fax:    Dialysis Facility (if applicable)   · Name:  · Address:  · Dialysis Schedule:  · Phone:  · Fax:    / signature: {Esignature:[id number]:::0}    PHYSICIAN SECTION    Prognosis: Good    Condition at Discharge: Stable    Rehab Potential (if transferring to Rehab): Good    Recommended Labs or Other Treatments After Discharge: BMP and CBC in 1 week    Physician Certification: I certify the above information and transfer of Ernst Lagunas  is necessary for the continuing treatment of the diagnosis listed and that he requires [facility] for less 30 days.      Update Admission H&P: No change in H&P    PHYSICIAN SIGNATURE:  Electronically signed by YONNY Dinh CNP on 5/28/21 at 11:11 AM EDT

## 2021-05-28 NOTE — PROGRESS NOTES
Patient has not voided since mayers was discontinued 5/27. Bladder scan shows >750ml. Mally served Ro Fenton CNP and was instructed to reinsert mayers catheter.

## 2021-05-28 NOTE — PROGRESS NOTES
Department of Orthopedic Surgery  Resident Progress Note    Patient seen and examined. Pain controlled. No new complaints. Denies chest pain, shortness of breath, dizziness/lightheadedness. Denies numbness, tingling, paraesthesias. Per chart, patient having some difficulty voiding urine.     VITALS:  /62   Pulse (!) 47   Temp 98.1 °F (36.7 °C) (Temporal)   Resp 16   Ht 5' 9\" (1.753 m)   Wt 179 lb (81.2 kg)   SpO2 99%   BMI 26.43 kg/m²     General: alert and oriented    MUSCULOSKELETAL:   right lower extremity:  · Dressing C/D/I  · Compartments soft and compressible  · +PF/DF/EHL  · +2/4 DP & PT pulses, Brisk Cap refill, Toes warm and perfused  · Distal sensation grossly intact to Peroneals, Sural, Saphenous, and tibial nrs    CBC:   Lab Results   Component Value Date    WBC 7.9 05/25/2021    HGB 9.5 05/28/2021    HCT 29.7 05/28/2021     05/25/2021     PT/INR:  No results found for: PROTIME, INR    ASSESSMENT  · S/P R hip CMN 5/26    PLAN    · WBAT RLE  · Deep venous thrombosis prophylaxis - lovenox inpatient transition to ASA outpatient, PCD's, early mobilization  · 24 hr abx completed  · PT/OT  · Pain Control: IV and PO  · Monitor H&H: Hemoglobin 9.5 today  · DC planning: Appreciate PT/OT, SW, case management, medicine, cardiology recommendations  · D/W Dr. Neel Mason

## 2021-05-28 NOTE — PROGRESS NOTES
This RN called report to receiving facility ([address] [place] [address]), RN gave report to BON Menon; After Visit Summary faxed to facility; Rx's and copy of AVS included in transfer packet.

## 2021-05-28 NOTE — CARE COORDINATION
Transition of Care-discharge order noted, [facility] will pick patient up to transfer to [facility] today at [address]. Wife Faye Ayers called and updated, as well as liaison and RN caring for patient.     Jessika LUCIAN, RN  JUDY   [phone]

## 2021-05-28 NOTE — DISCHARGE SUMMARY
Physician Discharge Summary     Patient ID:  Rohit العراقي  [id number]  66 y.o.  1940    Admit date: 5/24/2021    Discharge date and time: 5/28/2021    Admission Diagnoses:   Patient Active Problem List   Diagnosis    Left cataract    Closed fracture of right hip ([address]    Atrial flutter (HCC)    Bradycardia       Discharge Diagnoses: Right closed femur fracture    Consults: orthopedic surgery, cardiology, electrophysiology    Procedures: Open reduction internal fixation right hip 5/26    Hospital Course: The patient is a 79 y.o. male of Sherry Greenwood MD with significant past medical history of A. fib, bradycardia who presents with fall at home resulting in a right hip fracture. Ortho surgery was consulted for the fractured hip,  electrophysiology was consulted for bradycardia, a flutter, deferred to cardiology. Patient was medically cleared to have procedure. Patient tolerates procedure well has been up with physical therapy and has agreed to go to subacute rehab for further physical therapy needs. New medication on discharge Norco x5 days. Recent Labs     05/27/21  0749 05/28/21  0451   HGB 10.6* 9.5*   HCT 32.6* 29.7*       Recent Labs     05/27/21  0749 05/28/21  0451    135   K 4.8 4.2    99   CO2 28 29   BUN 25* 38*   CREATININE 0.9 1.1   CALCIUM 8.5* 8.4*       XR HIP BILATERAL W AP PELVIS (2 VIEWS)    Result Date: 5/24/2021  EXAMINATION: ONE XRAY VIEW OF THE PELVIS AND TWO XRAY VIEWS OF EACH OF THE BILATERAL HIPS 5/24/2021 8:14 am COMPARISON: None. HISTORY: ORDERING SYSTEM PROVIDED HISTORY: fall, right hip pain TECHNOLOGIST PROVIDED HISTORY: Reason for exam:->fall, right hip pain What reading provider will be dictating this exam?->CRC FINDINGS: Fracture of the proximal right femur extends from the greater trochanter to the medial subtrochanteric region with up to 0.7 cm displacement. The bones otherwise appear intact. No evidence of dislocation.   There are mild degenerative changes of the hips. Additional degenerative changes are partially imaged in the lower lumbar spine. There is calcific atherosclerosis. Mildly displaced right femoral fracture. XR FEMUR RIGHT (MIN 2 VIEWS)    Result Date: 5/24/2021  EXAMINATION: XRAY VIEWS OF THE RIGHT FEMUR 5/24/2021 11:14 am COMPARISON: None. HISTORY: ORDERING SYSTEM PROVIDED HISTORY: fall TECHNOLOGIST PROVIDED HISTORY: Reason for exam:->fall What reading provider will be dictating this exam?->CRC FINDINGS: Two views of the distal right femur were obtained. The distal femur is intact. There is a comminuted intratrochanteric fracture of the right hip. The femoral head is well seated within the acetabulum. 1. Comminuted intratrochanteric fracture of the right hip 2. There is no fracture of the distal femur. XR CHEST PORTABLE    Result Date: 5/24/2021  EXAMINATION: ONE XRAY VIEW OF THE CHEST 5/24/2021 11:14 am COMPARISON: 01/21/2021 HISTORY: ORDERING SYSTEM PROVIDED HISTORY: fall TECHNOLOGIST PROVIDED HISTORY: Reason for exam:->fall What reading provider will be dictating this exam?->CRC FINDINGS: The heart is enlarged. Postoperative sternotomy changes are noted. There is hazy bilateral pulmonary infiltration favored to represent mild CHF. There is no pleural effusion. There is no pneumothorax or gross rib fracture. 1. Cardiomegaly with findings of mild CHF. Discharge Exam:    HEENT: NCAT,  PERRLA, No JVD  Heart:  RRR, no murmurs, gallops, or rubs.   Lungs:  CTA bilaterally, no wheeze, rales or rhonchi  Abd: bowel sounds present, nontender, nondistended, no masses  Extrem:  No clubbing, cyanosis, or edema, weakness right lower extremity    Disposition: SNF     Patient Condition at Discharge: Stable    Patient Instructions:      Medication List      START taking these medications    oxyCODONE-acetaminophen 5-325 MG per tablet  Commonly known as: Percocet  Take 1 tablet by mouth every 6 hours as needed for

## 2021-05-28 NOTE — PROGRESS NOTES
Monitor check only:    Mr Aidee Núñez remains in asymptomatic AF with CVR. HRs 40s-50s bpm off Coreg. Patient to call office to schedule outpatient cardiac monitor at discharge. EP will sign off. Please call with concerns.     YONNY Shaw-CNP, [facility]

## 2021-05-28 NOTE — PLAN OF CARE
Problem: Falls - Risk of:  Goal: Will remain free from falls  Description: Will remain free from falls  Outcome: Completed  Goal: Absence of physical injury  Description: Absence of physical injury  Outcome: Completed     Problem: Pain:  Description: Pain management should include both nonpharmacologic and pharmacologic interventions.   Goal: Pain level will decrease  Description: Pain level will decrease  Outcome: Completed  Goal: Control of acute pain  Description: Control of acute pain  Outcome: Completed  Goal: Control of chronic pain  Description: Control of chronic pain  Outcome: Completed     Problem: Cardiac:  Goal: Ability to maintain vital signs within normal range will improve  Description: Ability to maintain vital signs within normal range will improve  Outcome: Completed  Goal: Cardiovascular alteration will improve  Description: Cardiovascular alteration will improve  Outcome: Completed     Problem: Health Behavior:  Goal: Will modify at least one risk factor affecting health status  Description: Will modify at least one risk factor affecting health status  Outcome: Completed  Goal: Identification of resources available to assist in meeting health care needs will improve  Description: Identification of resources available to assist in meeting health care needs will improve  Outcome: Completed     Problem: Physical Regulation:  Goal: Complications related to the disease process, condition or treatment will be avoided or minimized  Description: Complications related to the disease process, condition or treatment will be avoided or minimized  Outcome: Completed     Problem: Skin Integrity:  Goal: Will show no infection signs and symptoms  Description: Will show no infection signs and symptoms  Outcome: Completed  Goal: Absence of new skin breakdown  Description: Absence of new skin breakdown  Outcome: Completed

## 2021-06-04 DIAGNOSIS — S72.001A CLOSED FRACTURE OF RIGHT HIP, INITIAL ENCOUNTER (HCC): Primary | ICD-10-CM

## 2021-06-07 ENCOUNTER — HOSPITAL ENCOUNTER (OUTPATIENT)
Dept: GENERAL RADIOLOGY | Age: 81
Discharge: HOME | End: 2021-06-09
Payer: MEDICARE

## 2021-06-07 ENCOUNTER — OFFICE VISIT (OUTPATIENT)
Dept: ORTHOPEDIC SURGERY | Age: 81
End: 2021-06-07
Payer: MEDICARE

## 2021-06-07 DIAGNOSIS — S72.001D CLOSED FRACTURE OF RIGHT HIP WITH ROUTINE HEALING, SUBSEQUENT ENCOUNTER: Primary | ICD-10-CM

## 2021-06-07 DIAGNOSIS — S72.001A CLOSED FRACTURE OF RIGHT HIP, INITIAL ENCOUNTER (HCC): ICD-10-CM

## 2021-06-07 PROCEDURE — 99212 OFFICE O/P EST SF 10 MIN: CPT

## 2021-06-07 PROCEDURE — 73502 X-RAY EXAM HIP UNI 2-3 VIEWS: CPT

## 2021-06-07 PROCEDURE — 99024 POSTOP FOLLOW-UP VISIT: CPT | Performed by: PHYSICIAN ASSISTANT

## 2021-06-07 NOTE — PATIENT INSTRUCTIONS
Continue weight bearing as tolerated RLE. Recommend for patient to work on gait training at the facility at least 2 times per day.      Continue PT working on RLE stretching and strengthening exercises    Recommend continuing DVT prophylaxis in the form of baby aspirin for 28 days post op     F/u in 4 weeks    Call with any questions and concerns

## 2021-06-07 NOTE — PROGRESS NOTES
No chief complaint on file. OP:SURGEON: Dr. Lindsay Rankin DO  DATE OF PROCEDURE: 5-26-21  PROCEDURE: Right hip IT fracture ORIF    POD: 2 weeks    Subjective:  Keegan Santana is following up from the above surgery. He is WBAT on right lower extremity. He ambulates with assistive device, walker. Pain to extremity is mild and is not taking prescribed pain medication. They denies numbness or tingling to the right lower extremity. Denies calf pain, chest pain, or shortness of breath. Patient continues to use DVT prophylaxis, ASA 81 mg BID. Patient is participating in therapy at the facility. According to the patient's daughter, they are only getting him up 1 time or less at the facility and she would like a little bit more aggressive gait training work for him. Review of Systems -    General ROS: negative for - chills, fatigue, fever or night sweats  Respiratory ROS: no cough, shortness of breath, or wheezing  Cardiovascular ROS: no chest pain or dyspnea on exertion  Gastrointestinal ROS: no abdominal pain, nausea, vomiting, diarrhea, constipation,or black or bloody stools  Genitourinary: no hematuria, dysuria, or incontinence   Musculoskeletal ROS: negative for -back or neck pain or stiffness, also see HPI  Neurological ROS: no TIA or stroke symptoms       Objective:    General: Alert and oriented X 3, normocephalic atraumatic, external ears and eye normal, sclera clear, no acute distress, respirations easy and unlabored with no audible wheezes, skin warm and dry, speech and dress appropriate for noted age, affect euthymic. Extremity:  Right Lower Extremity  Skin clean dry and intact, without signs of infection. No active drainage, purulence, redness, dehiscence   Incision well healed. Sutures were removed and steri strips applied.    mild edema noted  Compartments supple throughout thigh and leg  Calf supple and not tender  negative Homans  Demonstrates active ankle dorsi and plantar flexion  States

## 2021-06-14 ENCOUNTER — TELEPHONE (OUTPATIENT)
Dept: CARDIOLOGY CLINIC | Age: 81
End: 2021-06-14

## 2021-06-15 ENCOUNTER — TELEPHONE (OUTPATIENT)
Dept: NON INVASIVE DIAGNOSTICS | Age: 81
End: 2021-06-15

## 2021-07-01 ENCOUNTER — APPOINTMENT (OUTPATIENT)
Dept: GENERAL RADIOLOGY | Age: 81
End: 2021-07-01
Payer: MEDICARE

## 2021-07-01 ENCOUNTER — HOSPITAL ENCOUNTER (EMERGENCY)
Age: 81
Discharge: HOME | End: 2021-07-02
Attending: EMERGENCY MEDICINE
Payer: MEDICARE

## 2021-07-01 DIAGNOSIS — E16.2 HYPOGLYCEMIA: Primary | ICD-10-CM

## 2021-07-01 DIAGNOSIS — N30.01 ACUTE CYSTITIS WITH HEMATURIA: ICD-10-CM

## 2021-07-01 LAB
HCT VFR BLD CALC: 39.9 % (ref 37–54)
HEMOGLOBIN: 12.6 G/DL (ref 12.5–16.5)
MCH RBC QN AUTO: 34.1 PG (ref 26–35)
MCHC RBC AUTO-ENTMCNC: 31.6 % (ref 32–34.5)
MCV RBC AUTO: 107.8 FL (ref 80–99.9)
METER GLUCOSE: 127 MG/DL (ref 74–99)
METER GLUCOSE: <40 MG/DL (ref 74–99)
PDW BLD-RTO: 12.8 FL (ref 11.5–15)
PLATELET # BLD: 183 E9/L (ref 130–450)
PMV BLD AUTO: 10.4 FL (ref 7–12)
RBC # BLD: 3.7 E12/L (ref 3.8–5.8)
WBC # BLD: 9.5 E9/L (ref 4.5–11.5)

## 2021-07-01 PROCEDURE — 82962 GLUCOSE BLOOD TEST: CPT

## 2021-07-01 PROCEDURE — 85027 COMPLETE CBC AUTOMATED: CPT

## 2021-07-01 PROCEDURE — 83690 ASSAY OF LIPASE: CPT

## 2021-07-01 PROCEDURE — 99284 EMERGENCY DEPT VISIT MOD MDM: CPT

## 2021-07-01 PROCEDURE — 71045 X-RAY EXAM CHEST 1 VIEW: CPT

## 2021-07-01 PROCEDURE — 84484 ASSAY OF TROPONIN QUANT: CPT

## 2021-07-01 PROCEDURE — 93005 ELECTROCARDIOGRAM TRACING: CPT | Performed by: EMERGENCY MEDICINE

## 2021-07-01 PROCEDURE — 83605 ASSAY OF LACTIC ACID: CPT

## 2021-07-01 PROCEDURE — 80048 BASIC METABOLIC PNL TOTAL CA: CPT

## 2021-07-01 PROCEDURE — 73502 X-RAY EXAM HIP UNI 2-3 VIEWS: CPT

## 2021-07-01 PROCEDURE — 36415 COLL VENOUS BLD VENIPUNCTURE: CPT

## 2021-07-01 RX ORDER — DEXTROSE MONOHYDRATE 25 G/50ML
INJECTION, SOLUTION INTRAVENOUS
Status: DISCONTINUED
Start: 2021-07-01 | End: 2021-07-02 | Stop reason: HOSPADM

## 2021-07-02 ENCOUNTER — APPOINTMENT (OUTPATIENT)
Dept: CT IMAGING | Age: 81
End: 2021-07-02
Payer: MEDICARE

## 2021-07-02 VITALS
RESPIRATION RATE: 15 BRPM | OXYGEN SATURATION: 97 % | HEART RATE: 69 BPM | SYSTOLIC BLOOD PRESSURE: 111 MMHG | TEMPERATURE: 97.7 F | DIASTOLIC BLOOD PRESSURE: 59 MMHG

## 2021-07-02 LAB
ANION GAP SERPL CALCULATED.3IONS-SCNC: 10 MMOL/L (ref 7–16)
BACTERIA: ABNORMAL /HPF
BILIRUBIN URINE: NEGATIVE
BLOOD, URINE: ABNORMAL
BUN BLDV-MCNC: 18 MG/DL (ref 6–23)
CALCIUM SERPL-MCNC: 9.2 MG/DL (ref 8.6–10.2)
CHLORIDE BLD-SCNC: 101 MMOL/L (ref 98–107)
CLARITY: CLEAR
CO2: 27 MMOL/L (ref 22–29)
COLOR: YELLOW
CREAT SERPL-MCNC: 1 MG/DL (ref 0.7–1.2)
EKG ATRIAL RATE: 54 BPM
EKG Q-T INTERVAL: 460 MS
EKG QRS DURATION: 116 MS
EKG QTC CALCULATION (BAZETT): 460 MS
EKG R AXIS: -72 DEGREES
EKG T AXIS: 30 DEGREES
EKG VENTRICULAR RATE: 60 BPM
GFR AFRICAN AMERICAN: >60
GFR NON-AFRICAN AMERICAN: >60 ML/MIN/1.73
GLUCOSE BLD-MCNC: 31 MG/DL (ref 74–99)
GLUCOSE URINE: NEGATIVE MG/DL
KETONES, URINE: NEGATIVE MG/DL
LACTIC ACID: 1.2 MMOL/L (ref 0.5–2.2)
LEUKOCYTE ESTERASE, URINE: ABNORMAL
LIPASE: 34 U/L (ref 13–60)
NITRITE, URINE: NEGATIVE
PH UA: 6.5 (ref 5–9)
POTASSIUM SERPL-SCNC: 4 MMOL/L (ref 3.5–5)
PROTEIN UA: NEGATIVE MG/DL
RBC UA: ABNORMAL /HPF (ref 0–2)
SODIUM BLD-SCNC: 138 MMOL/L (ref 132–146)
SPECIFIC GRAVITY UA: 1.01 (ref 1–1.03)
TROPONIN, HIGH SENSITIVITY: 40 NG/L (ref 0–11)
TROPONIN, HIGH SENSITIVITY: 41 NG/L (ref 0–11)
UROBILINOGEN, URINE: 0.2 E.U./DL
WBC UA: ABNORMAL /HPF (ref 0–5)

## 2021-07-02 PROCEDURE — 70450 CT HEAD/BRAIN W/O DYE: CPT

## 2021-07-02 PROCEDURE — 81001 URINALYSIS AUTO W/SCOPE: CPT

## 2021-07-02 PROCEDURE — 84484 ASSAY OF TROPONIN QUANT: CPT

## 2021-07-02 PROCEDURE — 87088 URINE BACTERIA CULTURE: CPT

## 2021-07-02 PROCEDURE — 93010 ELECTROCARDIOGRAM REPORT: CPT | Performed by: INTERNAL MEDICINE

## 2021-07-02 RX ORDER — CEFDINIR 300 MG/1
300 CAPSULE ORAL 2 TIMES DAILY
Qty: 20 CAPSULE | Refills: 0 | Status: SHIPPED | OUTPATIENT
Start: 2021-07-02 | End: 2021-07-12

## 2021-07-02 ASSESSMENT — ENCOUNTER SYMPTOMS
DIARRHEA: 0
NAUSEA: 0
BLOOD IN STOOL: 0
RHINORRHEA: 0
COLOR CHANGE: 0
TROUBLE SWALLOWING: 0
COUGH: 0
ABDOMINAL PAIN: 0
VOMITING: 0
SHORTNESS OF BREATH: 0

## 2021-07-02 NOTE — ED NOTES
Pt has heart monitor in place x 1 month. Per daughter, they aren't sure when it was or is supposed to come off. They aren't even sure anyone is still monitoring it.       Moise Mallory RN  07/02/21 0020

## 2021-07-02 NOTE — ED PROVIDER NOTES
ED PROVIDER NOTE    Chief Complaint   Patient presents with    Hip Pain       HPI:  7/1/21,   Time: 11:44 PM EDT       Rosario Vidales is a 79 y.o. male presenting to the ED for altered mental status. LKW 2000 when he went to sleep. Woke up around 2150 agitated, confused, writhing around in bed. Home health aide notified daughter and patient was brought to the ED. On initial arrival patient is confused and unable to provide history. BG is low. After giving D50 patient is more alert and states he has been feeling okay prior to this episode. No recent illness, fever, chills, nausea, vomiting, diarrhea. Has had intermittent constipation and loose stools in setting of recent R IT hip fx s/p ORIF w/ postop constipation and laxative use. Currently daughter at bedside feels patient is back to his normal neuro baseline. Patient denies pain in right hip. Chart review: hx of DM on glipizide, CAD s/p CABG, afib/flutter. R IT hip fx s/p ORIF on 5/24/21 Dr Nadir Leblanc    Review of Systems:     Review of Systems   Constitutional: Negative for appetite change, chills and fever. HENT: Negative for congestion, rhinorrhea and trouble swallowing. Eyes: Negative for visual disturbance. Respiratory: Negative for cough and shortness of breath. Cardiovascular: Negative for chest pain and leg swelling. Gastrointestinal: Negative for abdominal pain, blood in stool, diarrhea, nausea and vomiting. Genitourinary: Negative for decreased urine volume, difficulty urinating, dysuria, frequency, hematuria and urgency. Musculoskeletal: Negative for myalgias, neck pain and neck stiffness. Skin: Negative for color change. Neurological: Negative for dizziness, syncope, weakness, light-headedness, numbness and headaches. Psychiatric/Behavioral: Positive for confusion.         --------------------------------------------- PAST HISTORY ---------------------------------------------  Past Medical History:   History reviewed.  No pertinent past medical history. Past Surgical History:   History reviewed. No pertinent surgical history. Social History:   Social History     Socioeconomic History    Marital status:      Spouse name: None    Number of children: None    Years of education: None    Highest education level: None   Occupational History    None   Tobacco Use    Smoking status: Never Smoker    Smokeless tobacco: Never Used   Substance and Sexual Activity    Alcohol use: None    Drug use: None    Sexual activity: None   Other Topics Concern    None   Social History Narrative    None     Social Determinants of Health     Financial Resource Strain:     Difficulty of Paying Living Expenses:    Food Insecurity:     Worried About Running Out of Food in the Last Year:     Ran Out of Food in the Last Year:    Transportation Needs:     Lack of Transportation (Medical):  Lack of Transportation (Non-Medical):    Physical Activity:     Days of Exercise per Week:     Minutes of Exercise per Session:    Stress:     Feeling of Stress :    Social Connections:     Frequency of Communication with Friends and Family:     Frequency of Social Gatherings with Friends and Family:     Attends Orthodox Services:     Active Member of Clubs or Organizations:     Attends Club or Organization Meetings:     Marital Status:    Intimate Partner Violence:     Fear of Current or Ex-Partner:     Emotionally Abused:     Physically Abused:     Sexually Abused:        Family History:   History reviewed. No pertinent family history. The patients home medications have been reviewed. Allergies:   No Known Allergies        ---------------------------------------------------PHYSICAL EXAM--------------------------------------    There were no vitals taken for this visit. Physical Exam  Vitals and nursing note reviewed. Constitutional:       General: He is not in acute distress. Appearance: He is not toxic-appearing. Comments: On initial arrival, agitated, confused. After D50, alert, appropriately oriented, and in no acute distress   HENT:      Mouth/Throat:      Mouth: Mucous membranes are dry. Eyes:      General: No scleral icterus. Extraocular Movements: Extraocular movements intact. Pupils: Pupils are equal, round, and reactive to light. Cardiovascular:      Rate and Rhythm: Normal rate and regular rhythm. Pulses: Normal pulses. Heart sounds: Normal heart sounds. No murmur heard. Pulmonary:      Effort: Pulmonary effort is normal. No respiratory distress. Breath sounds: Normal breath sounds. No wheezing or rales. Abdominal:      General: There is no distension. Palpations: Abdomen is soft. Tenderness: There is no abdominal tenderness. There is no guarding or rebound. Musculoskeletal:         General: No swelling or tenderness. Normal range of motion. Cervical back: Normal range of motion and neck supple. No rigidity. No muscular tenderness. Comments: Radial, DP, and PT pulses 2+ bilaterally. R hip incisions c/d/i, no surrounding erythema/induration/warmth/crepitus/fluctuance/drainage   Skin:     General: Skin is warm and dry. Neurological:      Mental Status: He is alert and oriented to person, place, and time. Comments: Strength 5/5 and sensation grossly intact to light touch and equal bilaterally throughout all extremities             -------------------------------------------------- RESULTS -------------------------------------------------  I have personally reviewed all laboratory and imaging results for this patient. Results are listed below.      LABS:  Labs Reviewed   BASIC METABOLIC PANEL - Abnormal; Notable for the following components:       Result Value    Glucose 31 (*)     All other components within normal limits    Narrative:     CALL  [facility]  H34 tel. ,  Chemistry results called to and read back by Luca Gomez rn, 07/02/2021  00:16, by [address] [place] [address] CBC - Abnormal; Notable for the following components:    RBC 3.70 (*)     .8 (*)     MCHC 31.6 (*)     All other components within normal limits   URINALYSIS WITH MICROSCOPIC - Abnormal; Notable for the following components:    Blood, Urine MODERATE (*)     Leukocyte Esterase, Urine LARGE (*)     WBC, UA 10-20 (*)     RBC, UA 5-10 (*)     Bacteria, UA FEW (*)     All other components within normal limits   TROPONIN - Abnormal; Notable for the following components:    Troponin, High Sensitivity 41 (*)     All other components within normal limits   TROPONIN - Abnormal; Notable for the following components:    Troponin, High Sensitivity 40 (*)     All other components within normal limits   POCT GLUCOSE - Abnormal; Notable for the following components:    Meter Glucose <40 (*)     All other components within normal limits   POCT GLUCOSE - Abnormal; Notable for the following components:    Meter Glucose 127 (*)     All other components within normal limits   LACTIC ACID, PLASMA   LIPASE   TROPONIN       RADIOLOGY:  Interpreted personally and by Radiologist.  CT Head WO Contrast   Final Result   No acute intracranial abnormality. XR CHEST PORTABLE   Final Result   No acute process. Cardiomegaly. XR HIP 2-3 VW W PELVIS RIGHT   Final Result   No acute abnormality of the hip. EKG:  This EKG is signed and interpreted by the EP. Atrial flutter, vent rate 60bpm, LAD, RBBB, no acute injury pattern, no clinically significant change compared w/ prior EKG       ------------------------- NURSING NOTES AND VITALS REVIEWED ---------------------------   The nursing notes within the ED encounter and vital signs as below have been reviewed by myself. There were no vitals taken for this visit. Oxygen Saturation Interpretation: Normal    The patients available past medical records and past encounters were reviewed.         ------------------------------ ED COURSE/MEDICAL DECISION Physician         Jak Johnson MD  07/02/21 1708

## 2021-07-02 NOTE — ED NOTES
Pt now alert and oriented. Per family, pt is at baseline. Will recheck sugar in 20 minutes.       Morris Plasencia RN  07/01/21 [id number]

## 2021-07-04 LAB — URINE CULTURE, ROUTINE: NORMAL

## 2021-07-13 DIAGNOSIS — I48.92 ATRIAL FLUTTER, UNSPECIFIED TYPE (HCC): ICD-10-CM

## 2021-07-13 DIAGNOSIS — R00.1 BRADYCARDIA: ICD-10-CM

## 2021-07-14 ENCOUNTER — TELEPHONE (OUTPATIENT)
Dept: NON INVASIVE DIAGNOSTICS | Age: 81
End: 2021-07-14

## 2021-07-14 DIAGNOSIS — R94.31 EKG ABNORMALITIES: Primary | ICD-10-CM

## 2021-07-14 NOTE — TELEPHONE ENCOUNTER
Attempted to call the patient to review Dr. Kayce Ruano recommendations. Unable to leave a message, will try again at a later time.

## 2021-07-14 NOTE — TELEPHONE ENCOUNTER
----- Message from Cristin Valle sent at 7/13/2021  4:14 PM EDT -----    ----- Message -----  From: Diane Malloy MD  Sent: 7/13/2021   3:53 PM EDT  To: Cristin Valle    Please let him know that monitor showed no significant bradycardia but some NSVTs. Due to echo showed LV EF 45-50%. I would recommend Lexiscan to further exclude significant CAD. Hold off Beta-blocker for now.  Thanks.  ----- Message -----  From: Kerri Garcia MA  Sent: 7/13/2021  10:26 AM EDT  To: Diane Malloy MD

## 2021-07-14 NOTE — TELEPHONE ENCOUNTER
Spoke to the patient and his wife about Dr. Josefa Fuentes recommendations and they both verbalized understanding. Orders placed in Epic.

## 2021-09-15 ENCOUNTER — TELEPHONE (OUTPATIENT)
Dept: CARDIOLOGY CLINIC | Age: 81
End: 2021-09-15

## 2021-09-15 NOTE — TELEPHONE ENCOUNTER
Spoke to wife today to schedule follow up visit. Patient is at home but does not have a way to get to any of his visits per wife.   She states that when she is able to line something up, she will call to schedule

## 2021-09-20 ENCOUNTER — TELEPHONE (OUTPATIENT)
Dept: CARDIOLOGY | Age: 81
End: 2021-09-20

## 2021-09-20 NOTE — TELEPHONE ENCOUNTER
Called patient to schedule Lexiscan Nuclear stress test. Patient did not want to schedule due to no transportation. I offered the [employer] and he declined that. They will call our office to schedule should he change his mind.   Electronically signed by Ede Granados on 9/20/2021 at 1:09 PM

## 2022-12-05 NOTE — PROGRESS NOTES
Patient has a diet ordered just paged ortho to make sure he's not having surgery today Nursing/Physical therapy

## (undated) DEVICE — GUIDEWIRE ORTH L400MM DIA3.2MM FOR TFN

## (undated) DEVICE — INTENDED FOR TISSUE SEPARATION, AND OTHER PROCEDURES THAT REQUIRE A SHARP SURGICAL BLADE TO PUNCTURE OR CUT.: Brand: BARD-PARKER ® STAINLESS STEEL BLADES

## (undated) DEVICE — GOWN,BREATHABLE SLV,AURORA,XLG,STRL: Brand: MEDLINE

## (undated) DEVICE — TOTAL HIP PK

## (undated) DEVICE — DRIP REDUCTION MANIFOLD

## (undated) DEVICE — 1000 S-DRAPE TOWEL DRAPE 10/BX: Brand: STERI-DRAPE™

## (undated) DEVICE — GLOVE ORTHO 8   MSG9480

## (undated) DEVICE — SET ORTHO STD STORTSTD1

## (undated) DEVICE — TOWEL,OR,DSP,ST,BLUE,DLX,10/PK,8PK/CS: Brand: MEDLINE

## (undated) DEVICE — SURGICAL PROCEDURE PACK BASIC

## (undated) DEVICE — DRILL SYSTEM 7

## (undated) DEVICE — DRAPE EQUIP CARM 72X42 IN RUBBER BND CLP

## (undated) DEVICE — TUBING SUCT 12FR MAL ALUM SHFT FN CAP VENT UNIV CONN W/ OBT

## (undated) DEVICE — BLADE CLIPPER GEN PURP NS

## (undated) DEVICE — DRESSING,GAUZE,XEROFORM,CURAD,5"X9",ST: Brand: CURAD

## (undated) DEVICE — HANDPIECE SET WITH BONE CLEANING TIP AND SUCTION TUBE: Brand: INTERPULSE

## (undated) DEVICE — PATIENT RETURN ELECTRODE, SINGLE-USE, CONTACT QUALITY MONITORING, ADULT, WITH 9FT CORD, FOR PATIENTS WEIGING OVER 33LBS. (15KG): Brand: MEGADYNE

## (undated) DEVICE — APPLICATOR MEDICATED 26 CC SOLUTION HI LT ORNG CHLORAPREP

## (undated) DEVICE — READY WET SKIN SCRUB TRAY-LF: Brand: MEDLINE INDUSTRIES, INC.

## (undated) DEVICE — PADDING CAST W6INXL4YD COT LO LINTING WYTEX

## (undated) DEVICE — GLOVE ORANGE PI 8   MSG9080

## (undated) DEVICE — CLOTH SURG PREP PREOPERATIVE CHLORHEXIDINE GLUC 2% READYPREP

## (undated) DEVICE — Z INACTIVE USE 2660664 SOLUTION IRRIG 3000ML 0.9% SOD CHL USP UROMATIC PLAS CONT

## (undated) DEVICE — DRAPE,REIN 53X77,STERILE: Brand: MEDLINE

## (undated) DEVICE — 3M™ STERI-DRAPE™ U-DRAPE 1015: Brand: STERI-DRAPE™

## (undated) DEVICE — ZIMMER® STERILE DISPOSABLE TOURNIQUET CUFF WITH PROTECTIVE SLEEVE AND PLC, DUAL PORT, SINGLE BLADDER, 24 IN. (61 CM)

## (undated) DEVICE — Device

## (undated) DEVICE — BIT DRL L330MM DIA4.2MM CALIB L100MM ST 3 FLUT QUIK CPL FOR

## (undated) DEVICE — SET ORTHO STD STORTSTD2